# Patient Record
Sex: FEMALE | Race: WHITE | HISPANIC OR LATINO | Employment: STUDENT | URBAN - METROPOLITAN AREA
[De-identification: names, ages, dates, MRNs, and addresses within clinical notes are randomized per-mention and may not be internally consistent; named-entity substitution may affect disease eponyms.]

---
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
## 2023-02-20 ENCOUNTER — OFFICE VISIT (OUTPATIENT)
Dept: FAMILY MEDICINE CLINIC | Facility: CLINIC | Age: 18
End: 2023-02-20

## 2023-02-20 VITALS
HEIGHT: 67 IN | WEIGHT: 167 LBS | DIASTOLIC BLOOD PRESSURE: 76 MMHG | HEART RATE: 58 BPM | OXYGEN SATURATION: 100 % | SYSTOLIC BLOOD PRESSURE: 120 MMHG | BODY MASS INDEX: 26.21 KG/M2

## 2023-02-20 DIAGNOSIS — R73.03 PREDIABETES: ICD-10-CM

## 2023-02-20 DIAGNOSIS — J45.20 MILD INTERMITTENT ASTHMA WITHOUT COMPLICATION: ICD-10-CM

## 2023-02-20 DIAGNOSIS — F90.2 ATTENTION DEFICIT HYPERACTIVITY DISORDER (ADHD), COMBINED TYPE: ICD-10-CM

## 2023-02-20 DIAGNOSIS — J45.20 MILD INTERMITTENT ASTHMATIC BRONCHITIS WITHOUT COMPLICATION: Primary | ICD-10-CM

## 2023-02-20 PROBLEM — J45.909 ASTHMATIC BRONCHITIS WITHOUT COMPLICATION: Status: ACTIVE | Noted: 2023-02-20

## 2023-02-20 RX ORDER — DEXTROMETHORPHAN HYDROBROMIDE AND PROMETHAZINE HYDROCHLORIDE 15; 6.25 MG/5ML; MG/5ML
5 SOLUTION ORAL 4 TIMES DAILY PRN
Qty: 118 ML | Refills: 0 | Status: SHIPPED | OUTPATIENT
Start: 2023-02-20

## 2023-02-20 RX ORDER — AZITHROMYCIN 250 MG/1
TABLET, FILM COATED ORAL
Qty: 6 TABLET | Refills: 0 | Status: SHIPPED | OUTPATIENT
Start: 2023-02-20 | End: 2023-02-25

## 2023-02-20 RX ORDER — DEXTROAMPHETAMINE SACCHARATE, AMPHETAMINE ASPARTATE, DEXTROAMPHETAMINE SULFATE AND AMPHETAMINE SULFATE 1.25; 1.25; 1.25; 1.25 MG/1; MG/1; MG/1; MG/1
1 TABLET ORAL 2 TIMES DAILY
Qty: 60 TABLET | Refills: 0 | Status: SHIPPED | OUTPATIENT
Start: 2023-02-20

## 2023-02-20 NOTE — PROGRESS NOTES
Office Visit Note  23     Connie Boxer 21 y o  female MRN: [id number]  : 2005    Assessment:     1  Mild intermittent asthmatic bronchitis without complication  Assessment & Plan:  Patient with cough congestion symptoms bringing up sometimes yellowish phlegm has been sick couple of weeks ago but still has a cough history of asthma had to use inhaler couple of times but she did not hear any wheezing  Lungs sound clear at this time will prescribe Z-Gee and Phenergan DM       2  Attention deficit hyperactivity disorder (ADHD), combined type  Assessment & Plan:  Patient with attention deficit disorder studying in [place]  She has been on Adderall 5 mg twice a day  We will continue the medication again recommend follow-up with a psychiatrist also she has been trying to get 1 in [place]  3  Prediabetes  Assessment & Plan:  Family history of diabetes last hemoglobin A1c 5 9 we will get a repeat 1 in follow-up      4  Mild intermittent asthma without complication  Assessment & Plan:  Patient with recent asthmatic bronchitis we will continue the inhaler on a as needed basis        BMI Counseling: Body mass index is 26 16 kg/m²  The BMI is above normal  Nutrition recommendations include decreasing portion sizes, decreasing fast food intake, consuming healthier snacks, moderation in carbohydrate intake and reducing intake of cholesterol  Exercise recommendations include moderate physical activity 150 minutes/week  No pharmacotherapy was ordered  Rationale for BMI follow-up plan is due to patient being overweight or obese  Discussion Summary and Plan: Today's care plan and medications were reviewed with patient in detail and all their questions answered to their satisfaction  Chief Complaint   Patient presents with   • Follow-up      Subjective:  Patient is coming here for evaluation regarding symptoms of cough congestion    Patient's roommates were sick couple of weeks ago at the [facility] and subsequently she also developed some cough congestion symptoms sometime bringing up yellowish phlegm now  She had to use the inhaler couple of times  She did not have wheezing  No fever  He had mild sore throat but nothing significant  Medications reviewed  The following portions of the patient's history were reviewed and updated as appropriate: allergies, current medications, past family history, past medical history, past social history, past surgical history and problem list     Review of Systems   Constitutional: Negative for chills and fever  HENT: Negative for ear pain and sore throat  Eyes: Negative for pain and visual disturbance  Respiratory: Positive for cough  Negative for shortness of breath  Cardiovascular: Negative for chest pain and palpitations  Gastrointestinal: Negative for abdominal pain and vomiting  Genitourinary: Negative for dysuria and hematuria  Musculoskeletal: Negative for arthralgias and back pain  Skin: Negative for color change and rash  Neurological: Negative for seizures and syncope  All other systems reviewed and are negative  Historical Information   Patient Active Problem List   Diagnosis   • Attention deficit hyperactivity disorder (ADHD), combined type   • Mild intermittent asthma without complication   • Prediabetes   • Asthmatic bronchitis without complication     Past Medical History:   Diagnosis Date   • Asthma    • Iron deficiency anemia secondary to inadequate dietary iron intake      History reviewed  No pertinent surgical history    Social History     Substance and Sexual Activity   Alcohol Use Yes    Comment: special occasions     Social History     Substance and Sexual Activity   Drug Use Never     Social History     Tobacco Use   Smoking Status Never   Smokeless Tobacco Never     Family History   Problem Relation Age of Onset   • Diabetes Mother    • Hyperlipidemia Mother    • Breast cancer Maternal Aunt    • Breast cancer Maternal Aunt      Health Maintenance Due   Topic   • Hepatitis C Screening    • Pneumococcal Vaccine: Pediatrics (0 to 5 Years) and At-Risk Patients (6 to 59 Years) (1 - PCV)   • DTaP,Tdap,and Td Vaccines (1 - Tdap)   • HPV Vaccine (1 - 2-dose series)   • Depression Screening    • HIV Screening    • BMI: Followup Plan    • COVID-19 Vaccine (3 - Booster for [employer] series)   • Influenza Vaccine (1)      Meds/Allergies       Current Outpatient Medications:   •  albuterol (PROVENTIL HFA,VENTOLIN HFA) 90 mcg/act inhaler, Inhale 2 puffs every 6 (six) hours as needed for wheezing, Disp: , Rfl:   •  amphetamine-dextroamphetamine (ADDERALL) 5 MG tablet, Take 1 tablet (5 mg total) by mouth 2 (two) times a day Max Daily Amount: 10 mg, Disp: 60 tablet, Rfl: 0  •  etonogestrel-ethinyl estradiol (EluRyng) 0 12-0 015 MG/24HR vaginal ring, Insert 1 each into the vagina every 28 days Insert vaginally and leave in place for 3 consecutive weeks, then remove for 1 week , Disp: 3 each, Rfl: 3      Objective:    Vitals:   /76 (BP Location: Right arm, Patient Position: Sitting, Cuff Size: Standard)   Pulse 58   Ht 5' 7" (1 702 m)   Wt 75 8 kg (167 lb)   SpO2 100%   BMI 26 16 kg/m²   Body mass index is 26 16 kg/m²  Vitals:    02/20/23 1150   Weight: 75 8 kg (167 lb)       Physical Exam  Vitals and nursing note reviewed  Constitutional:       Appearance: Normal appearance  HENT:      Mouth/Throat:      Comments: Mild congestion in the throat  Cardiovascular:      Rate and Rhythm: Normal rate and regular rhythm  Heart sounds: Normal heart sounds  Pulmonary:      Effort: Pulmonary effort is normal       Breath sounds: Normal breath sounds  Musculoskeletal:      Cervical back: Normal range of motion and neck supple  Right lower leg: No edema  Left lower leg: No edema  Neurological:      Mental Status: She is alert and oriented to person, place, and time           Lab Review   No visits with results within 2 Month(s) from this visit  Latest known visit with results is:   Annual Exam on 08/23/2022   Component Date Value Ref Range Status   • N gonorrhoeae, DNA Probe 08/23/2022 Negative  Negative Final   • Chlamydia trachomatis, DNA Probe 08/23/2022 Negative  Negative Final         Mendoza Osborne MD        "This note has been constructed using a voice recognition system  Therefore there may be syntax, spelling, and/or grammatical errors   Please call if you have any questions  "

## 2023-02-20 NOTE — ASSESSMENT & PLAN NOTE
Patient with attention deficit disorder studying in [place]  She has been on Adderall 5 mg twice a day  We will continue the medication again recommend follow-up with a psychiatrist also she has been trying to get 1 in [place]

## 2023-02-20 NOTE — ASSESSMENT & PLAN NOTE
Patient with cough congestion symptoms bringing up sometimes yellowish phlegm has been sick couple of weeks ago but still has a cough history of asthma had to use inhaler couple of times but she did not hear any wheezing    Lungs sound clear at this time will prescribe Z-Gee and Phenergan DM

## 2023-03-14 LAB
ALBUMIN SERPL-MCNC: 4.6 G/DL (ref 3.9–5)
ALBUMIN/GLOB SERPL: 2.4 {RATIO} (ref 1.2–2.2)
ALP SERPL-CCNC: 70 IU/L (ref 42–106)
ALT SERPL-CCNC: 11 IU/L (ref 0–32)
AST SERPL-CCNC: 14 IU/L (ref 0–40)
BILIRUB SERPL-MCNC: 0.6 MG/DL (ref 0–1.2)
BUN SERPL-MCNC: 13 MG/DL (ref 6–20)
BUN/CREAT SERPL: 19 (ref 9–23)
CALCIUM SERPL-MCNC: 9 MG/DL (ref 8.7–10.2)
CHLORIDE SERPL-SCNC: 103 MMOL/L (ref 96–106)
CO2 SERPL-SCNC: 21 MMOL/L (ref 20–29)
CREAT SERPL-MCNC: 0.7 MG/DL (ref 0.57–1)
EGFR: 128 ML/MIN/1.73
GLOBULIN SER-MCNC: 1.9 G/DL (ref 1.5–4.5)
GLUCOSE SERPL-MCNC: 97 MG/DL (ref 70–99)
HBA1C MFR BLD: 5.7 % (ref 4.8–5.6)
POTASSIUM SERPL-SCNC: 4.6 MMOL/L (ref 3.5–5.2)
PROT SERPL-MCNC: 6.5 G/DL (ref 6–8.5)
SODIUM SERPL-SCNC: 140 MMOL/L (ref 134–144)

## 2023-03-17 ENCOUNTER — OFFICE VISIT (OUTPATIENT)
Dept: FAMILY MEDICINE CLINIC | Facility: CLINIC | Age: 18
End: 2023-03-17

## 2023-03-17 VITALS
SYSTOLIC BLOOD PRESSURE: 120 MMHG | OXYGEN SATURATION: 99 % | HEART RATE: 82 BPM | BODY MASS INDEX: 26.93 KG/M2 | HEIGHT: 67 IN | WEIGHT: 171.6 LBS | DIASTOLIC BLOOD PRESSURE: 76 MMHG

## 2023-03-17 DIAGNOSIS — J45.20 MILD INTERMITTENT ASTHMA WITHOUT COMPLICATION: ICD-10-CM

## 2023-03-17 DIAGNOSIS — F90.2 ATTENTION DEFICIT HYPERACTIVITY DISORDER (ADHD), COMBINED TYPE: Primary | ICD-10-CM

## 2023-03-17 DIAGNOSIS — R73.03 PREDIABETES: ICD-10-CM

## 2023-03-17 PROBLEM — J45.909 ASTHMATIC BRONCHITIS WITHOUT COMPLICATION: Status: RESOLVED | Noted: 2023-02-20 | Resolved: 2023-03-17

## 2023-03-17 RX ORDER — DEXTROAMPHETAMINE SACCHARATE, AMPHETAMINE ASPARTATE, DEXTROAMPHETAMINE SULFATE AND AMPHETAMINE SULFATE 1.25; 1.25; 1.25; 1.25 MG/1; MG/1; MG/1; MG/1
1 TABLET ORAL 2 TIMES DAILY
Qty: 60 TABLET | Refills: 0 | Status: SHIPPED | OUTPATIENT
Start: 2023-03-17

## 2023-03-17 NOTE — ASSESSMENT & PLAN NOTE
Patient with attention deficit disorder currently taking Adderall 5 mg twice a day appears to be helping we will continue with the same    She is going to be followed up in [place] where she is studying by the psychiatrist over there regarding the same

## 2023-03-17 NOTE — ASSESSMENT & PLAN NOTE
Hemoglobin A1c came down to 5 7 she is going to follow strict diet with regards to carbohydrates and sugar we will be repeating the lab in 4 months from now including A1c

## 2023-03-17 NOTE — PROGRESS NOTES
Office Visit Note  23     See Leary 21 y o  female MRN: [id number]  : 2005    Assessment:     1  Attention deficit hyperactivity disorder (ADHD), combined type  Assessment & Plan:  Patient with attention deficit disorder currently taking Adderall 5 mg twice a day appears to be helping we will continue with the same  She is going to be followed up in [place] where she is studying by the psychiatrist over there regarding the same      2  Mild intermittent asthma without complication  Assessment & Plan:  Asthmatic bronchitis has resolved but patient on and off occasionally uses Proventil inhaler we will continue to do so  Orders:  -     amphetamine-dextroamphetamine (ADDERALL) 5 MG tablet; Take 1 tablet (5 mg total) by mouth 2 (two) times a day Max Daily Amount: 10 mg    3  Prediabetes  Assessment & Plan:  Hemoglobin A1c came down to 5 7 she is going to follow strict diet with regards to carbohydrates and sugar we will be repeating the lab in 4 months from now including A1c  Orders:  -     Comprehensive metabolic panel; Future  -     Hemoglobin A1C; Future; Expected date: 2023        Depression Screening and Follow-up Plan: Patient was screened for depression during today's encounter  They screened negative with a PHQ-2 score of 0  Discussion Summary and Plan: Today's care plan and medications were reviewed with patient in detail and all their questions answered to their satisfaction  Chief Complaint   Patient presents with   • Follow-up      Subjective:  Patient has come in for a follow-up evaluation with a history of attention deficit disorder currently taking Adderall 5 mg twice a day  Patient also had asthmatic bronchitis last month which got resolved with medications  She occasionally still uses the inhaler  Labs reviewed hemoglobin A1c came down from 5 9-5 7 family history of diabetes    She has cut back on sugary substances which appears to be helping at this point       The following portions of the patient's history were reviewed and updated as appropriate: allergies, current medications, past family history, past medical history, past social history, past surgical history and problem list     Review of Systems   Constitutional: Negative for chills and fever  HENT: Negative for ear pain and sore throat  Eyes: Negative for pain and visual disturbance  Respiratory: Negative for cough and shortness of breath  Cardiovascular: Negative for chest pain and palpitations  Gastrointestinal: Negative for abdominal pain and vomiting  Genitourinary: Negative for dysuria and hematuria  Musculoskeletal: Negative for arthralgias and back pain  Skin: Negative for color change and rash  Neurological: Negative for seizures and syncope  All other systems reviewed and are negative  Historical Information   Patient Active Problem List   Diagnosis   • Attention deficit hyperactivity disorder (ADHD), combined type   • Mild intermittent asthma without complication   • Prediabetes     Past Medical History:   Diagnosis Date   • Asthma    • Iron deficiency anemia secondary to inadequate dietary iron intake      History reviewed  No pertinent surgical history    Social History     Substance and Sexual Activity   Alcohol Use Yes    Comment: special occasions     Social History     Substance and Sexual Activity   Drug Use Never     Social History     Tobacco Use   Smoking Status Never   • Passive exposure: Never   Smokeless Tobacco Never     Family History   Problem Relation Age of Onset   • Diabetes Mother    • Hyperlipidemia Mother    • Breast cancer Maternal Aunt    • Breast cancer Maternal Aunt      Health Maintenance Due   Topic   • Hepatitis C Screening    • Pneumococcal Vaccine: Pediatrics (0 to 5 Years) and At-Risk Patients (6 to 59 Years) (1 - PCV)   • DTaP,Tdap,and Td Vaccines (1 - Tdap)   • HPV Vaccine (1 - 2-dose series)   • HIV Screening    • COVID-19 Vaccine (3 - Booster for [employer] series)   • Influenza Vaccine (1)      Meds/Allergies       Current Outpatient Medications:   •  albuterol (PROVENTIL HFA,VENTOLIN HFA) 90 mcg/act inhaler, Inhale 2 puffs every 6 (six) hours as needed for wheezing, Disp: , Rfl:   •  amphetamine-dextroamphetamine (ADDERALL) 5 MG tablet, Take 1 tablet (5 mg total) by mouth 2 (two) times a day Max Daily Amount: 10 mg, Disp: 60 tablet, Rfl: 0  •  etonogestrel-ethinyl estradiol (EluRyng) 0 12-0 015 MG/24HR vaginal ring, Insert 1 each into the vagina every 28 days Insert vaginally and leave in place for 3 consecutive weeks, then remove for 1 week , Disp: 3 each, Rfl: 3      Objective:    Vitals:   /76 (BP Location: Right arm, Patient Position: Sitting, Cuff Size: Standard)   Pulse 82   Ht 5' 7" (1 702 m)   Wt 77 8 kg (171 lb 9 6 oz)   SpO2 99%   BMI 26 88 kg/m²   Body mass index is 26 88 kg/m²  Vitals:    03/17/23 1053   Weight: 77 8 kg (171 lb 9 6 oz)       Physical Exam  Vitals and nursing note reviewed  Constitutional:       Appearance: Normal appearance  Cardiovascular:      Rate and Rhythm: Normal rate and regular rhythm  Heart sounds: Normal heart sounds  Pulmonary:      Effort: Pulmonary effort is normal       Breath sounds: Normal breath sounds  Musculoskeletal:      Cervical back: Normal range of motion and neck supple  Right lower leg: No edema  Left lower leg: No edema  Neurological:      Mental Status: She is alert           Lab Review   Orders Only on 03/13/2023   Component Date Value Ref Range Status   • Glucose, Random 03/13/2023 97  70 - 99 mg/dL Final   • BUN 03/13/2023 13  6 - 20 mg/dL Final   • Creatinine 03/13/2023 0 70  0 57 - 1 00 mg/dL Final   • eGFR 03/13/2023 128  >59 mL/min/1 73 Final   • SL AMB BUN/CREATININE RATIO 03/13/2023 19  9 - 23 Final   • Sodium 03/13/2023 140  134 - 144 mmol/L Final   • Potassium 03/13/2023 4 6  3 5 - 5 2 mmol/L Final   • Chloride 03/13/2023 103  96 - 106 mmol/L Final   • CO2 03/13/2023 21  20 - 29 mmol/L Final   • CALCIUM 03/13/2023 9 0  8 7 - 10 2 mg/dL Final   • Protein, Total 03/13/2023 6 5  6 0 - 8 5 g/dL Final   • Albumin 03/13/2023 4 6  3 9 - 5 0 g/dL Final   • Globulin, Total 03/13/2023 1 9  1 5 - 4 5 g/dL Final   • Albumin/Globulin Ratio 03/13/2023 2 4 (H)  1 2 - 2 2 Final   • TOTAL BILIRUBIN 03/13/2023 0 6  0 0 - 1 2 mg/dL Final   • Alk Phos Isoenzymes 03/13/2023 70  42 - 106 IU/L Final   • AST 03/13/2023 14  0 - 40 IU/L Final   • ALT 03/13/2023 11  0 - 32 IU/L Final   • Hemoglobin A1C 03/13/2023 5 7 (H)  4 8 - 5 6 % Final    Comment:          Prediabetes: 5 7 - 6 4           Diabetes: >6 4           Glycemic control for adults with diabetes: <7 0           Mendoza Osborne MD        "This note has been constructed using a voice recognition system  Therefore there may be syntax, spelling, and/or grammatical errors   Please call if you have any questions  "

## 2023-03-17 NOTE — ASSESSMENT & PLAN NOTE
Asthmatic bronchitis has resolved but patient on and off occasionally uses Proventil inhaler we will continue to do so

## 2023-05-22 ENCOUNTER — OFFICE VISIT (OUTPATIENT)
Dept: FAMILY MEDICINE CLINIC | Facility: CLINIC | Age: 18
End: 2023-05-22

## 2023-05-22 VITALS
BODY MASS INDEX: 25.39 KG/M2 | OXYGEN SATURATION: 99 % | HEART RATE: 105 BPM | WEIGHT: 161.8 LBS | HEIGHT: 67 IN | SYSTOLIC BLOOD PRESSURE: 110 MMHG | DIASTOLIC BLOOD PRESSURE: 70 MMHG

## 2023-05-22 DIAGNOSIS — F90.2 ATTENTION DEFICIT HYPERACTIVITY DISORDER (ADHD), COMBINED TYPE: Primary | ICD-10-CM

## 2023-05-22 DIAGNOSIS — J45.20 MILD INTERMITTENT ASTHMA WITHOUT COMPLICATION: ICD-10-CM

## 2023-05-22 DIAGNOSIS — R73.03 PREDIABETES: ICD-10-CM

## 2023-05-22 RX ORDER — DEXTROAMPHETAMINE SACCHARATE, AMPHETAMINE ASPARTATE, DEXTROAMPHETAMINE SULFATE AND AMPHETAMINE SULFATE 1.25; 1.25; 1.25; 1.25 MG/1; MG/1; MG/1; MG/1
1 TABLET ORAL 2 TIMES DAILY
Qty: 60 TABLET | Refills: 0 | Status: SHIPPED | OUTPATIENT
Start: 2023-05-22

## 2023-05-22 NOTE — ASSESSMENT & PLAN NOTE
Patient with a history of asthmatic bronchitis in the past currently on albuterol inhaler on a as needed basis we will continue

## 2023-05-22 NOTE — PROGRESS NOTES
Office Visit Note  23     Briana Bose 19 y o  female MRN: [id number]  : 2005    Assessment:     1  Attention deficit hyperactivity disorder (ADHD), combined type  Assessment & Plan:  Continue with Adderall 15 mg twice a day follow-up with a psychiatrist in [place]  2  Mild intermittent asthma without complication  Assessment & Plan:  Patient with a history of asthmatic bronchitis in the past currently on albuterol inhaler on a as needed basis we will continue    Orders:  -     amphetamine-dextroamphetamine (ADDERALL) 5 MG tablet; Take 1 tablet (5 mg total) by mouth 2 (two) times a day Max Daily Amount: 10 mg    3  Prediabetes  Assessment & Plan:  Last A1c 5 7 follow-up with repeat 22 later date               Discussion Summary and Plan: Today's care plan and medications were reviewed with patient in detail and all their questions answered to their satisfaction  Chief Complaint   Patient presents with   • Follow-up     F/U and RX Refill  Canelo Ramirez        Subjective:  Patient is coming here for a follow-up evaluation with regards to symptoms of attention deficit disorder currently taking Adderall for the same  She is going to be followed by a psychologist through the [facility] in [place]  Labs done in the past had shown her hemoglobin A1c at 5 7 in both parents being diabetic  He is trying to follow strict diet we will follow-up with repeat A1c later  Meanwhile we will renew her medication for the Adderall  The following portions of the patient's history were reviewed and updated as appropriate: allergies, current medications, past family history, past medical history, past social history, past surgical history and problem list     Review of Systems   Constitutional: Negative for chills and fever  HENT: Negative for ear pain and sore throat  Eyes: Negative for pain and visual disturbance  Respiratory: Negative for cough and shortness of breath      Cardiovascular: Negative for chest pain and palpitations  Gastrointestinal: Negative for abdominal pain and vomiting  Genitourinary: Negative for dysuria and hematuria  Musculoskeletal: Negative for arthralgias and back pain  Skin: Negative for color change and rash  Neurological: Negative for seizures and syncope  All other systems reviewed and are negative  Historical Information   Patient Active Problem List   Diagnosis   • Attention deficit hyperactivity disorder (ADHD), combined type   • Mild intermittent asthma without complication   • Prediabetes     Past Medical History:   Diagnosis Date   • Asthma    • Iron deficiency anemia secondary to inadequate dietary iron intake      No past surgical history on file    Social History     Substance and Sexual Activity   Alcohol Use Not Currently    Comment: special occasions     Social History     Substance and Sexual Activity   Drug Use Never     Social History     Tobacco Use   Smoking Status Never   • Passive exposure: Never   Smokeless Tobacco Never     Family History   Problem Relation Age of Onset   • Diabetes Mother    • Hyperlipidemia Mother    • Breast cancer Maternal Aunt    • Breast cancer Maternal Aunt      Health Maintenance Due   Topic   • Hepatitis C Screening    • Pneumococcal Vaccine: Pediatrics (0 to 5 Years) and At-Risk Patients (6 to 59 Years) (1 - PCV)   • DTaP,Tdap,and Td Vaccines (1 - Tdap)   • HPV Vaccine (1 - 2-dose series)   • HIV Screening    • COVID-19 Vaccine (3 - Booster for [employer] series)      Meds/Allergies       Current Outpatient Medications:   •  albuterol (PROVENTIL HFA,VENTOLIN HFA) 90 mcg/act inhaler, Inhale 2 puffs every 6 (six) hours as needed for wheezing, Disp: , Rfl:   •  amphetamine-dextroamphetamine (ADDERALL) 5 MG tablet, Take 1 tablet (5 mg total) by mouth 2 (two) times a day Max Daily Amount: 10 mg, Disp: 60 tablet, Rfl: 0  •  etonogestrel-ethinyl estradiol (EluRyng) 0 12-0 015 MG/24HR vaginal ring, Insert 1 each into the vagina "every 28 days Insert vaginally and leave in place for 3 consecutive weeks, then remove for 1 week , Disp: 3 each, Rfl: 3      Objective:    Vitals:   /70 (BP Location: Right arm, Patient Position: Sitting, Cuff Size: Standard)   Pulse 105   Ht 5' 7\" (1 702 m)   Wt 73 4 kg (161 lb 12 8 oz)   SpO2 99%   BMI 25 34 kg/m²   Body mass index is 25 34 kg/m²  Vitals:    05/22/23 1428   Weight: 73 4 kg (161 lb 12 8 oz)       Physical Exam  Vitals and nursing note reviewed  Constitutional:       Appearance: Normal appearance  Cardiovascular:      Rate and Rhythm: Normal rate and regular rhythm  Heart sounds: Normal heart sounds  Pulmonary:      Effort: Pulmonary effort is normal       Breath sounds: Normal breath sounds  Musculoskeletal:      Cervical back: Normal range of motion and neck supple  Right lower leg: No edema  Left lower leg: No edema  Neurological:      Mental Status: She is alert and oriented to person, place, and time  Lab Review   No visits with results within 2 Month(s) from this visit     Latest known visit with results is:   Orders Only on 03/13/2023   Component Date Value Ref Range Status   • Glucose, Random 03/13/2023 97  70 - 99 mg/dL Final   • BUN 03/13/2023 13  6 - 20 mg/dL Final   • Creatinine 03/13/2023 0 70  0 57 - 1 00 mg/dL Final   • eGFR 03/13/2023 128  >59 mL/min/1 73 Final   • SL AMB BUN/CREATININE RATIO 03/13/2023 19  9 - 23 Final   • Sodium 03/13/2023 140  134 - 144 mmol/L Final   • Potassium 03/13/2023 4 6  3 5 - 5 2 mmol/L Final   • Chloride 03/13/2023 103  96 - 106 mmol/L Final   • CO2 03/13/2023 21  20 - 29 mmol/L Final   • CALCIUM 03/13/2023 9 0  8 7 - 10 2 mg/dL Final   • Protein, Total 03/13/2023 6 5  6 0 - 8 5 g/dL Final   • Albumin 03/13/2023 4 6  3 9 - 5 0 g/dL Final   • Globulin, Total 03/13/2023 1 9  1 5 - 4 5 g/dL Final   • Albumin/Globulin Ratio 03/13/2023 2 4 (H)  1 2 - 2 2 Final   • TOTAL BILIRUBIN 03/13/2023 0 6  0 0 - 1 2 mg/dL " "Final   • Alk Phos Isoenzymes 03/13/2023 70  42 - 106 IU/L Final   • AST 03/13/2023 14  0 - 40 IU/L Final   • ALT 03/13/2023 11  0 - 32 IU/L Final   • Hemoglobin A1C 03/13/2023 5 7 (H)  4 8 - 5 6 % Final    Comment:          Prediabetes: 5 7 - 6 4           Diabetes: >6 4           Glycemic control for adults with diabetes: <7 0           Joan Aguilar MD        \"This note has been constructed using a voice recognition system  Therefore there may be syntax, spelling, and/or grammatical errors  Please call if you have any questions   \"  "

## 2023-06-22 ENCOUNTER — OFFICE VISIT (OUTPATIENT)
Dept: FAMILY MEDICINE CLINIC | Facility: CLINIC | Age: 18
End: 2023-06-22
Payer: COMMERCIAL

## 2023-06-22 VITALS
OXYGEN SATURATION: 99 % | HEART RATE: 78 BPM | DIASTOLIC BLOOD PRESSURE: 70 MMHG | SYSTOLIC BLOOD PRESSURE: 110 MMHG | BODY MASS INDEX: 24.33 KG/M2 | HEIGHT: 67 IN | WEIGHT: 155 LBS

## 2023-06-22 DIAGNOSIS — R73.03 PREDIABETES: ICD-10-CM

## 2023-06-22 DIAGNOSIS — F90.2 ATTENTION DEFICIT HYPERACTIVITY DISORDER (ADHD), COMBINED TYPE: Primary | ICD-10-CM

## 2023-06-22 DIAGNOSIS — J45.20 MILD INTERMITTENT ASTHMA WITHOUT COMPLICATION: ICD-10-CM

## 2023-06-22 PROCEDURE — 99213 OFFICE O/P EST LOW 20 MIN: CPT | Performed by: INTERNAL MEDICINE

## 2023-06-22 RX ORDER — DEXTROAMPHETAMINE SACCHARATE, AMPHETAMINE ASPARTATE, DEXTROAMPHETAMINE SULFATE AND AMPHETAMINE SULFATE 1.25; 1.25; 1.25; 1.25 MG/1; MG/1; MG/1; MG/1
1 TABLET ORAL 2 TIMES DAILY
Qty: 60 TABLET | Refills: 0 | Status: SHIPPED | OUTPATIENT
Start: 2023-06-22

## 2023-06-22 RX ORDER — ALBUTEROL SULFATE 90 UG/1
2 AEROSOL, METERED RESPIRATORY (INHALATION) EVERY 6 HOURS PRN
Qty: 8 G | Refills: 1 | Status: SHIPPED | OUTPATIENT
Start: 2023-06-22

## 2023-06-22 NOTE — ASSESSMENT & PLAN NOTE
Patient with a history of asthma her current inhaler has  we will renew the same    She did not have to use the inhalers in the recent past

## 2023-06-22 NOTE — PROGRESS NOTES
Office Visit Note  23     Chhaya Goddard 19 y o  female MRN: [id number]  : 2005    Assessment:     1  Attention deficit hyperactivity disorder (ADHD), combined type  Assessment & Plan: We will continue the medication Adderall she is going to have a follow-up appointment with the psychiatrist in [place] also  2  Prediabetes  Assessment & Plan:  Last A1c 5 7 patient is going to have repeat 1 done in July      3  Mild intermittent asthma without complication  Assessment & Plan:  Patient with a history of asthma her current inhaler has  we will renew the same  She did not have to use the inhalers in the recent past               Discussion Summary and Plan: Today's care plan and medications were reviewed with patient in detail and all their questions answered to their satisfaction  Chief Complaint   Patient presents with   • Follow-up      Subjective:  Patient is coming in for a follow-up evaluation with a history of attention deficit disorder currently taking Adderall  Patient also has history of asthma but did not have to use any inhalers in the recent past   Last lab had shown a prediabetic range of hemoglobin A1c at 5 7 it was 5 9 before it is coming down  The following portions of the patient's history were reviewed and updated as appropriate: allergies, current medications, past family history, past medical history, past social history, past surgical history and problem list     Review of Systems   Constitutional: Negative for chills and fever  HENT: Negative for ear pain and sore throat  Eyes: Negative for pain and visual disturbance  Respiratory: Negative for cough and shortness of breath  Cardiovascular: Negative for chest pain and palpitations  Gastrointestinal: Negative for abdominal pain and vomiting  Genitourinary: Negative for dysuria and hematuria  Musculoskeletal: Negative for arthralgias and back pain  Skin: Negative for color change and rash  Neurological: Negative for seizures and syncope  All other systems reviewed and are negative  Historical Information   Patient Active Problem List   Diagnosis   • Attention deficit hyperactivity disorder (ADHD), combined type   • Mild intermittent asthma without complication   • Prediabetes     Past Medical History:   Diagnosis Date   • Asthma    • Iron deficiency anemia secondary to inadequate dietary iron intake      History reviewed  No pertinent surgical history    Social History     Substance and Sexual Activity   Alcohol Use Not Currently    Comment: special occasions     Social History     Substance and Sexual Activity   Drug Use Never     Social History     Tobacco Use   Smoking Status Never   • Passive exposure: Never   Smokeless Tobacco Never     Family History   Problem Relation Age of Onset   • Diabetes Mother    • Hyperlipidemia Mother    • Breast cancer Maternal Aunt    • Breast cancer Maternal Aunt      Health Maintenance Due   Topic   • Hepatitis C Screening    • Pneumococcal Vaccine: Pediatrics (0 to 5 Years) and At-Risk Patients (6 to 59 Years) (1 - PCV)   • DTaP,Tdap,and Td Vaccines (1 - Tdap)   • HPV Vaccine (1 - 2-dose series)   • HIV Screening    • COVID-19 Vaccine (3 - Pfizer series)   • Annual Physical    • Influenza Vaccine (Season Ended)      Meds/Allergies       Current Outpatient Medications:   •  albuterol (PROVENTIL HFA,VENTOLIN HFA) 90 mcg/act inhaler, Inhale 2 puffs every 6 (six) hours as needed for wheezing, Disp: , Rfl:   •  amphetamine-dextroamphetamine (ADDERALL) 5 MG tablet, Take 1 tablet (5 mg total) by mouth 2 (two) times a day Max Daily Amount: 10 mg, Disp: 60 tablet, Rfl: 0  •  etonogestrel-ethinyl estradiol (EluRyng) 0 12-0 015 MG/24HR vaginal ring, Insert 1 each into the vagina every 28 days Insert vaginally and leave in place for 3 consecutive weeks, then remove for 1 week , Disp: 3 each, Rfl: 3      Objective:    Vitals:   /70 (BP Location: Right arm, "Patient Position: Sitting, Cuff Size: Standard)   Pulse 78   Ht 5' 7\" (1 702 m)   Wt 70 3 kg (155 lb)   SpO2 99%   BMI 24 28 kg/m²   Body mass index is 24 28 kg/m²  Vitals:    06/22/23 0907   Weight: 70 3 kg (155 lb)       Physical Exam  Vitals and nursing note reviewed  Constitutional:       Appearance: Normal appearance  Cardiovascular:      Rate and Rhythm: Normal rate and regular rhythm  Heart sounds: Normal heart sounds  Pulmonary:      Effort: Pulmonary effort is normal       Breath sounds: Normal breath sounds  Musculoskeletal:      Cervical back: Normal range of motion and neck supple  Right lower leg: No edema  Left lower leg: No edema  Neurological:      Mental Status: She is alert  Lab Review   No visits with results within 2 Month(s) from this visit     Latest known visit with results is:   Orders Only on 03/13/2023   Component Date Value Ref Range Status   • Glucose, Random 03/13/2023 97  70 - 99 mg/dL Final   • BUN 03/13/2023 13  6 - 20 mg/dL Final   • Creatinine 03/13/2023 0 70  0 57 - 1 00 mg/dL Final   • eGFR 03/13/2023 128  >59 mL/min/1 73 Final   • SL AMB BUN/CREATININE RATIO 03/13/2023 19  9 - 23 Final   • Sodium 03/13/2023 140  134 - 144 mmol/L Final   • Potassium 03/13/2023 4 6  3 5 - 5 2 mmol/L Final   • Chloride 03/13/2023 103  96 - 106 mmol/L Final   • CO2 03/13/2023 21  20 - 29 mmol/L Final   • CALCIUM 03/13/2023 9 0  8 7 - 10 2 mg/dL Final   • Protein, Total 03/13/2023 6 5  6 0 - 8 5 g/dL Final   • Albumin 03/13/2023 4 6  3 9 - 5 0 g/dL Final   • Globulin, Total 03/13/2023 1 9  1 5 - 4 5 g/dL Final   • Albumin/Globulin Ratio 03/13/2023 2 4 (H)  1 2 - 2 2 Final   • TOTAL BILIRUBIN 03/13/2023 0 6  0 0 - 1 2 mg/dL Final   • Alk Phos Isoenzymes 03/13/2023 70  42 - 106 IU/L Final   • AST 03/13/2023 14  0 - 40 IU/L Final   • ALT 03/13/2023 11  0 - 32 IU/L Final   • Hemoglobin A1C 03/13/2023 5 7 (H)  4 8 - 5 6 % Final    Comment:          Prediabetes: 5 7 - " "6 4           Diabetes: >6 4           Glycemic control for adults with diabetes: <7 0           Esdras Rodriguez MD        \"This note has been constructed using a voice recognition system  Therefore there may be syntax, spelling, and/or grammatical errors  Please call if you have any questions   \"  "

## 2023-06-22 NOTE — ASSESSMENT & PLAN NOTE
We will continue the medication Adderall she is going to have a follow-up appointment with the psychiatrist in [place] also

## 2023-07-21 ENCOUNTER — TELEMEDICINE (OUTPATIENT)
Dept: FAMILY MEDICINE CLINIC | Facility: CLINIC | Age: 18
End: 2023-07-21
Payer: COMMERCIAL

## 2023-07-21 VITALS — WEIGHT: 155 LBS | BODY MASS INDEX: 24.28 KG/M2

## 2023-07-21 DIAGNOSIS — J45.20 MILD INTERMITTENT ASTHMA WITHOUT COMPLICATION: Primary | ICD-10-CM

## 2023-07-21 PROCEDURE — 99441 PR PHYS/QHP TELEPHONE EVALUATION 5-10 MIN: CPT | Performed by: INTERNAL MEDICINE

## 2023-07-21 RX ORDER — BUDESONIDE 180 UG/1
1 AEROSOL, POWDER RESPIRATORY (INHALATION) 2 TIMES DAILY
Qty: 1 EACH | Refills: 1 | Status: SHIPPED | OUTPATIENT
Start: 2023-07-21

## 2023-07-26 NOTE — PROGRESS NOTES
Virtual Brief Visit    This Visit is being completed by telephone. The patient was identified by name and date of birth. Padmini Strauss was informed that this is a telemedicine visit and that the visit is being conducted through Telephone. My office door was closed. No one else was in the room. and The patient was notified the following individuals were present in the room none. She acknowledged consent and understanding of privacy and security of the video platform. The patient has agreed to participate and understands they can discontinue the visit at any time. Patient is aware this is a billable service. Assessment/Plan:    Problem List Items Addressed This Visit        Respiratory    Mild intermittent asthma without complication - Primary     Patient has developed cough congestion symptoms and was seen by the physician in the [facility] symptoms persisting. Patient with mild intermittent asthma on albuterol inhaler as needed. No fever mild tightness feeling. We will add Pulmicort flex inhaler and see if it makes a difference. If necessary oral steroids. Patient does not appear to be in any distress         Relevant Medications    budesonide (Pulmicort Flexhaler) 180 MCG/ACT inhaler       Recent Visits  Date Type Provider Dept   07/21/23 Telemedicine Mendoza Saldivar MD [facility]   Showing recent visits within past 7 days and meeting all other requirements  Future Appointments  No visits were found meeting these conditions.   Showing future appointments within next 150 days and meeting all other requirements         Visit Time  Total Visit Duration: 10 minutes

## 2023-07-26 NOTE — ASSESSMENT & PLAN NOTE
Patient has developed cough congestion symptoms and was seen by the physician in the [facility] symptoms persisting. Patient with mild intermittent asthma on albuterol inhaler as needed. No fever mild tightness feeling. We will add Pulmicort flex inhaler and see if it makes a difference. If necessary oral steroids.   Patient does not appear to be in any distress

## 2023-08-22 NOTE — PROGRESS NOTES
ANNUAL    Name: Jef Ann      : 2005      MRN: [id number]  Encounter Provider: Charito Dior MD  Encounter Date: 2023   Encounter department: [address] [place] [address] Road     1. Viral syndrome  Assessment & Plan:  As mentioned above patient with viral syndrome symptoms getting better but still cough present with yellowish phlegm we will give her prednisone if necessary Zithromax. 2. Attention deficit hyperactivity disorder (ADHD), combined type  Assessment & Plan:  Patient with attention deficit disorder we will increase the dose of the Adderall to 10 mg twice a day and follow-up with a psychiatrist in [place]    Orders:  -     amphetamine-dextroamphetamine (ADDERALL, 10MG,) 10 mg tablet; Take 1 tablet (10 mg total) by mouth 2 (two) times a day Max Daily Amount: 20 mg    3. Mild intermittent asthma without complication  Assessment & Plan:  Patient with history of asthmatic bronchitis recently flulike symptoms was on prednisone and albuterol inhaler symptoms still persisting we will give her another 5 days of prednisone patient may need antibiotic Zithromax also. Orders:  -     predniSONE 20 mg tablet; Take 1 tablet (20 mg total) by mouth daily  -     azithromycin (Zithromax) 250 mg tablet; Take 2 tablets (500 mg total) by mouth daily for 1 day, THEN 1 tablet (250 mg total) daily for 4 days. 4. Prediabetes  Assessment & Plan:  Patient with A1c of 5.7 we will go for repeat blood test however I recommended not to go at this time because of being on prednisone she can give like 4 weeks time after finishing the course to go for the blood test.           Subjective     Patient is coming in for evaluation regarding cough congestion symptoms recently diagnosed with flulike symptoms was seen by a physician at [place] and was prescribed prednisone 20 mg daily for 5 days got better but the symptoms are still persisting. Patient bringing up some yellowish phlegm also. History of asthma. Patient also with a history of attention deficit disorder currently taking 5 mg twice daily of Adderall but it appears to be not helping much. Review of Systems   Constitutional: Negative for chills and fever. HENT: Negative for ear pain and sore throat. Eyes: Negative for pain and visual disturbance. Respiratory: Positive for cough and shortness of breath. Cardiovascular: Negative for chest pain and palpitations. Gastrointestinal: Negative for abdominal pain and vomiting. Genitourinary: Negative for dysuria and hematuria. Musculoskeletal: Negative for arthralgias and back pain. Skin: Negative for color change and rash. Neurological: Negative for seizures and syncope. All other systems reviewed and are negative. Past Medical History:   Diagnosis Date   • Asthma    • Iron deficiency anemia secondary to inadequate dietary iron intake      History reviewed. No pertinent surgical history.   Family History   Problem Relation Age of Onset   • Diabetes Mother    • Hyperlipidemia Mother    • Breast cancer Maternal Aunt    • Breast cancer Maternal Aunt      Social History     Socioeconomic History   • Marital status: Single     Spouse name: None   • Number of children: None   • Years of education: None   • Highest education level: None   Occupational History   • None   Tobacco Use   • Smoking status: Never     Passive exposure: Never   • Smokeless tobacco: Never   Vaping Use   • Vaping Use: Former   • Substances: Nicotine, Flavoring   Substance and Sexual Activity   • Alcohol use: Not Currently     Comment: special occasions   • Drug use: Never   • Sexual activity: Yes     Partners: Male     Birth control/protection: Condom Male, Ring   Other Topics Concern   • None   Social History Narrative   • None     Social Determinants of Health     Financial Resource Strain: Not on file   Food Insecurity: Not on file   Transportation Needs: Not on file   Physical Activity: Not on file Stress: Not on file   Social Connections: Not on file   Intimate Partner Violence: Not on file   Housing Stability: Not on file     Current Outpatient Medications on File Prior to Visit   Medication Sig   • albuterol (PROVENTIL HFA,VENTOLIN HFA) 90 mcg/act inhaler Inhale 2 puffs every 6 (six) hours as needed for wheezing   • budesonide (Pulmicort Flexhaler) 180 MCG/ACT inhaler Inhale 1 puff 2 (two) times a day Rinse mouth after use. • etonogestrel-ethinyl estradiol (EluRyng) 0.12-0.015 MG/24HR vaginal ring Insert 1 each into the vagina every 28 days Insert vaginally and leave in place for 3 consecutive weeks, then remove for 1 week. • [DISCONTINUED] amphetamine-dextroamphetamine (ADDERALL) 5 MG tablet Take 1 tablet (5 mg total) by mouth 2 (two) times a day Max Daily Amount: 10 mg     No Known Allergies  Immunization History   Administered Date(s) Administered   • COVID-19 PFIZER VACCINE 0.3 ML IM 04/20/2021, 05/28/2021       Objective     /70 (BP Location: Right arm, Patient Position: Sitting, Cuff Size: Standard)   Pulse 62   Ht 5' 7" (1.702 m)   Wt 68.9 kg (152 lb)   SpO2 99%   BMI 23.81 kg/m²     Physical Exam  Vitals and nursing note reviewed. Constitutional:       Appearance: Normal appearance. Cardiovascular:      Rate and Rhythm: Normal rate and regular rhythm. Heart sounds: Normal heart sounds. Pulmonary:      Effort: Pulmonary effort is normal.      Breath sounds: Wheezing present. Comments: Minimal wheeze noted  Musculoskeletal:      Cervical back: Normal range of motion and neck supple. Right lower leg: No edema. Left lower leg: No edema. Neurological:      Mental Status: She is alert.        Toni Kim MD

## 2023-08-22 NOTE — PROGRESS NOTES
ANNUAL      Name: Carlos Ortega      : 2005      MRN: [id number]  Encounter Provider: Ritesh Long MD  Encounter Date: 2023   Encounter department: [address] [place] [address]     {There are no diagnoses linked to this encounter. (Refresh or delete this SmartLink)}       Subjective     HPI  Review of Systems    Past Medical History:   Diagnosis Date   • Asthma    • Iron deficiency anemia secondary to inadequate dietary iron intake      No past surgical history on file.   Family History   Problem Relation Age of Onset   • Diabetes Mother    • Hyperlipidemia Mother    • Breast cancer Maternal Aunt    • Breast cancer Maternal Aunt      Social History     Socioeconomic History   • Marital status: Single     Spouse name: Not on file   • Number of children: Not on file   • Years of education: Not on file   • Highest education level: Not on file   Occupational History   • Not on file   Tobacco Use   • Smoking status: Never     Passive exposure: Never   • Smokeless tobacco: Never   Vaping Use   • Vaping Use: Former   • Substances: Nicotine, Flavoring   Substance and Sexual Activity   • Alcohol use: Not Currently     Comment: special occasions   • Drug use: Never   • Sexual activity: Yes     Partners: Male     Birth control/protection: Condom Male, Ring   Other Topics Concern   • Not on file   Social History Narrative   • Not on file     Social Determinants of Health     Financial Resource Strain: Not on file   Food Insecurity: Not on file   Transportation Needs: Not on file   Physical Activity: Not on file   Stress: Not on file   Social Connections: Not on file   Intimate Partner Violence: Not on file   Housing Stability: Not on file     Current Outpatient Medications on File Prior to Visit   Medication Sig   • albuterol (PROVENTIL HFA,VENTOLIN HFA) 90 mcg/act inhaler Inhale 2 puffs every 6 (six) hours as needed for wheezing   • amphetamine-dextroamphetamine (ADDERALL) 5 MG tablet Take 1 tablet (5 mg total) by mouth 2 (two) times a day Max Daily Amount: 10 mg   • budesonide (Pulmicort Flexhaler) 180 MCG/ACT inhaler Inhale 1 puff 2 (two) times a day Rinse mouth after use. • etonogestrel-ethinyl estradiol (EluRyng) 0.12-0.015 MG/24HR vaginal ring Insert 1 each into the vagina every 28 days Insert vaginally and leave in place for 3 consecutive weeks, then remove for 1 week. No Known Allergies  Immunization History   Administered Date(s) Administered   • COVID-19 PFIZER VACCINE 0.3 ML IM 04/20/2021, 05/28/2021       Objective     There were no vitals taken for this visit.     Physical Exam  Violetta Raphael MD

## 2023-08-23 ENCOUNTER — OFFICE VISIT (OUTPATIENT)
Dept: FAMILY MEDICINE CLINIC | Facility: CLINIC | Age: 18
End: 2023-08-23
Payer: COMMERCIAL

## 2023-08-23 VITALS
BODY MASS INDEX: 23.86 KG/M2 | HEART RATE: 62 BPM | DIASTOLIC BLOOD PRESSURE: 70 MMHG | HEIGHT: 67 IN | SYSTOLIC BLOOD PRESSURE: 110 MMHG | WEIGHT: 152 LBS | OXYGEN SATURATION: 99 %

## 2023-08-23 DIAGNOSIS — F90.2 ATTENTION DEFICIT HYPERACTIVITY DISORDER (ADHD), COMBINED TYPE: ICD-10-CM

## 2023-08-23 DIAGNOSIS — B34.9 VIRAL SYNDROME: Primary | ICD-10-CM

## 2023-08-23 DIAGNOSIS — J45.20 MILD INTERMITTENT ASTHMA WITHOUT COMPLICATION: ICD-10-CM

## 2023-08-23 DIAGNOSIS — R73.03 PREDIABETES: ICD-10-CM

## 2023-08-23 PROCEDURE — 99214 OFFICE O/P EST MOD 30 MIN: CPT | Performed by: INTERNAL MEDICINE

## 2023-08-23 RX ORDER — PREDNISONE 20 MG/1
20 TABLET ORAL DAILY
Qty: 5 TABLET | Refills: 0 | Status: SHIPPED | OUTPATIENT
Start: 2023-08-23

## 2023-08-23 RX ORDER — AZITHROMYCIN 250 MG/1
TABLET, FILM COATED ORAL
Qty: 6 TABLET | Refills: 0 | Status: SHIPPED | OUTPATIENT
Start: 2023-08-23 | End: 2023-08-28

## 2023-08-23 RX ORDER — DEXTROAMPHETAMINE SACCHARATE, AMPHETAMINE ASPARTATE, DEXTROAMPHETAMINE SULFATE AND AMPHETAMINE SULFATE 2.5; 2.5; 2.5; 2.5 MG/1; MG/1; MG/1; MG/1
10 TABLET ORAL
Qty: 60 TABLET | Refills: 0 | Status: SHIPPED | OUTPATIENT
Start: 2023-08-23

## 2023-08-23 NOTE — ASSESSMENT & PLAN NOTE
As mentioned above patient with viral syndrome symptoms getting better but still cough present with yellowish phlegm we will give her prednisone if necessary Zithromax.

## 2023-08-23 NOTE — ASSESSMENT & PLAN NOTE
Patient with history of asthmatic bronchitis recently flulike symptoms was on prednisone and albuterol inhaler symptoms still persisting we will give her another 5 days of prednisone patient may need antibiotic Zithromax also.

## 2023-08-23 NOTE — ASSESSMENT & PLAN NOTE
Patient with A1c of 5.7 we will go for repeat blood test however I recommended not to go at this time because of being on prednisone she can give like 4 weeks time after finishing the course to go for the blood test.

## 2023-08-23 NOTE — ASSESSMENT & PLAN NOTE
Patient with attention deficit disorder we will increase the dose of the Adderall to 10 mg twice a day and follow-up with a psychiatrist in [place]

## 2023-08-25 ENCOUNTER — ANNUAL EXAM (OUTPATIENT)
Dept: OBGYN CLINIC | Facility: CLINIC | Age: 18
End: 2023-08-25
Payer: COMMERCIAL

## 2023-08-25 VITALS
BODY MASS INDEX: 24.14 KG/M2 | WEIGHT: 153.8 LBS | DIASTOLIC BLOOD PRESSURE: 72 MMHG | SYSTOLIC BLOOD PRESSURE: 100 MMHG | HEIGHT: 67 IN

## 2023-08-25 DIAGNOSIS — Z30.44 ENCOUNTER FOR SURVEILLANCE OF VAGINAL RING HORMONAL CONTRACEPTIVE DEVICE: ICD-10-CM

## 2023-08-25 DIAGNOSIS — Z11.3 ROUTINE SCREENING FOR STI (SEXUALLY TRANSMITTED INFECTION): ICD-10-CM

## 2023-08-25 DIAGNOSIS — Z01.419 WOMEN'S ANNUAL ROUTINE GYNECOLOGICAL EXAMINATION: Primary | ICD-10-CM

## 2023-08-25 PROCEDURE — 87491 CHLMYD TRACH DNA AMP PROBE: CPT | Performed by: OBSTETRICS & GYNECOLOGY

## 2023-08-25 PROCEDURE — 99385 PREV VISIT NEW AGE 18-39: CPT | Performed by: OBSTETRICS & GYNECOLOGY

## 2023-08-25 PROCEDURE — 87591 N.GONORRHOEAE DNA AMP PROB: CPT | Performed by: OBSTETRICS & GYNECOLOGY

## 2023-08-25 RX ORDER — ETONOGESTREL AND ETHINYL ESTRADIOL 11.7; 2.7 MG/1; MG/1
1 INSERT, EXTENDED RELEASE VAGINAL
Qty: 3 EACH | Refills: 3 | Status: SHIPPED | OUTPATIENT
Start: 2023-08-25

## 2023-08-25 NOTE — PROGRESS NOTES
Susan Jovel is a 19 y.o. female who presents for annual well woman exam. Periods are regular every 28-30 days, lasting 5 days. No intermenstrual bleeding, spotting, or discharge. Current contraception: desires to restart Nuva ring  Family history of uterine or ovarian cancer: no    Family history of breast cancer: yes - maternal aunt, maternal cousin     Menstrual History:  OB History        0    Para   0    Term   0       0    AB   0    Living   0       SAB   0    IAB   0    Ectopic   0    Multiple   0    Live Births   0                  Patient's last menstrual period was 2023 (approximate). Period Cycle (Days): 28  Period Duration (Days): 5  Period Pattern: Regular  Menstrual Flow: Heavy, Moderate, Light  Dysmenorrhea: (!) Moderate  Dysmenorrhea Symptoms: Other (Comment) (lower back pain)    The following portions of the patient's history were reviewed and updated as appropriate: allergies, current medications, past family history, past medical history, past social history, past surgical history and problem list.    Review of Systems  Review of Systems   Constitutional: Negative for activity change, appetite change, chills, fatigue and fever. Respiratory: Negative for apnea, cough, chest tightness and shortness of breath. Cardiovascular: Negative for chest pain, palpitations and leg swelling. Gastrointestinal: Negative for abdominal pain, constipation, diarrhea, nausea and vomiting. Genitourinary: Negative for difficulty urinating, dysuria, flank pain, frequency, hematuria and urgency. Neurological: Negative for dizziness, seizures, syncope, light-headedness, numbness and headaches. Psychiatric/Behavioral: Negative for agitation and confusion.           Objective      /72 (BP Location: Left arm, Patient Position: Sitting, Cuff Size: Adult)   Ht 5' 7" (1.702 m)   Wt 69.8 kg (153 lb 12.8 oz)   LMP 2023 (Approximate)   BMI 24.09 kg/m²     Physical Exam  OBGyn Exam     General:   alert and oriented, in no acute distress, alert, appears stated age and cooperative   Heart: regular rate and rhythm, S1, S2 normal, no murmur, click, rub or gallop   Lungs: clear to auscultation bilaterally   Abdomen: soft, non-tender, without masses or organomegaly   Vulva: normal   Vagina: normal mucosa, normal discharge   Cervix: no cervical motion tenderness and no lesions   Uterus: normal size   Adnexa:  Breast Exam:  normal adnexa  breasts appear normal, no suspicious masses, no skin or nipple changes or axillary nodes. Assessment      @well woman@ . 49-year-old female  Annual exam  Desires NuvaRing for contraception was using it in the past  Family history of breast cancer mother to be tested for BRCA  Plan   GC/CT  Diet/exercise  Calcium/vitamin D  NuvaRing with a next menstrual cycle  Condom with sexual activity  Return to office for annual exam   All questions answered. There are no Patient Instructions on file for this visit.

## 2023-08-28 LAB
C TRACH DNA SPEC QL NAA+PROBE: NEGATIVE
N GONORRHOEA DNA SPEC QL NAA+PROBE: NEGATIVE

## 2023-08-29 ENCOUNTER — TELEPHONE (OUTPATIENT)
Dept: PSYCHIATRY | Facility: CLINIC | Age: 18
End: 2023-08-29

## 2023-08-29 NOTE — LETTER
Dear Severino Leger :    We are contacting you because your name is currently included on the [address] [place] [address] wait-list for Talk Therapy and/or Medication Management. (Please [place] which services are needed)     In our efforts to provide the highest quality care, [facility] has begun the process of upgrading our behavioral health systems to increase efficiency and expedite delivery of services. As part of this process, we ask you to please confirm your continued interest in the services above. If you are no longer interested or in need, please rosemarie “No” in the area below. If you are still interested and in need, please rosemarie “Yes” and provide your most current demographic and insurance information within 15 days. If we do not receive confirmation from you by 2023 your information will not be included in the system upgrade and your place on the waitlist will be lost.     Thank you in advance for your patience and understanding and we apologize for any inconvenience this may cause. Patient Name and :    Still in need of services: Yes or No     Current Address:     Phone#:     Best time to receive a call: Insurance Carrier:      Policy/ID#: Group#: Insurance Services Phone#:      What is your current presenting problem? Open to virtual talk therapy: Yes or No      We will call you to do an Intake when an appointment becomes available. You can send this information back to us in any of the ways below:    Email: [email or web address]). Jazmine Finger  Fax#:  [phone]  Mail:   [address] [place] [address], [address] [place] [address]

## 2024-01-08 ENCOUNTER — OFFICE VISIT (OUTPATIENT)
Dept: FAMILY MEDICINE CLINIC | Facility: CLINIC | Age: 19
End: 2024-01-08
Payer: COMMERCIAL

## 2024-01-08 VITALS
HEART RATE: 101 BPM | DIASTOLIC BLOOD PRESSURE: 70 MMHG | WEIGHT: 154.6 LBS | SYSTOLIC BLOOD PRESSURE: 110 MMHG | HEIGHT: 67 IN | BODY MASS INDEX: 24.27 KG/M2 | OXYGEN SATURATION: 98 %

## 2024-01-08 DIAGNOSIS — B37.9 YEAST INFECTION: ICD-10-CM

## 2024-01-08 DIAGNOSIS — E78.5 DYSLIPIDEMIA: ICD-10-CM

## 2024-01-08 DIAGNOSIS — J45.20 MILD INTERMITTENT ASTHMA WITHOUT COMPLICATION: ICD-10-CM

## 2024-01-08 DIAGNOSIS — R73.03 PREDIABETES: ICD-10-CM

## 2024-01-08 DIAGNOSIS — F90.2 ATTENTION DEFICIT HYPERACTIVITY DISORDER (ADHD), COMBINED TYPE: Primary | ICD-10-CM

## 2024-01-08 DIAGNOSIS — Z13.0 SCREENING FOR DEFICIENCY ANEMIA: ICD-10-CM

## 2024-01-08 DIAGNOSIS — Z13.29 SCREENING FOR THYROID DISORDER: ICD-10-CM

## 2024-01-08 DIAGNOSIS — N39.0 URINARY TRACT INFECTION WITHOUT HEMATURIA, SITE UNSPECIFIED: ICD-10-CM

## 2024-01-08 PROCEDURE — 99214 OFFICE O/P EST MOD 30 MIN: CPT | Performed by: INTERNAL MEDICINE

## 2024-01-08 RX ORDER — FLUCONAZOLE 150 MG/1
150 TABLET ORAL ONCE
Qty: 1 TABLET | Refills: 0 | Status: SHIPPED | OUTPATIENT
Start: 2024-01-08 | End: 2024-01-08

## 2024-01-08 RX ORDER — DEXTROAMPHETAMINE SACCHARATE, AMPHETAMINE ASPARTATE, DEXTROAMPHETAMINE SULFATE AND AMPHETAMINE SULFATE 2.5; 2.5; 2.5; 2.5 MG/1; MG/1; MG/1; MG/1
10 TABLET ORAL
Qty: 60 TABLET | Refills: 0 | Status: SHIPPED | OUTPATIENT
Start: 2024-01-08

## 2024-01-08 RX ORDER — ALBUTEROL SULFATE 90 UG/1
2 AEROSOL, METERED RESPIRATORY (INHALATION) EVERY 6 HOURS PRN
Qty: 8 G | Refills: 1 | Status: SHIPPED | OUTPATIENT
Start: 2024-01-08

## 2024-01-08 RX ORDER — SULFAMETHOXAZOLE AND TRIMETHOPRIM 800; 160 MG/1; MG/1
1 TABLET ORAL 2 TIMES DAILY
Qty: 10 TABLET | Refills: 0 | Status: SHIPPED | OUTPATIENT
Start: 2024-01-08 | End: 2024-01-13

## 2024-01-08 NOTE — PROGRESS NOTES
Name: Lyssa Teran      : 2005      MRN: [id number]  Encounter Provider: Mendoza Osborne MD  Encounter Date: 2024   Encounter department: [facility] [place]    Assessment & Plan     1. Attention deficit hyperactivity disorder (ADHD), combined type  Assessment & Plan:  Patient is currently taking Adderall 10 mg twice a day he is also being followed by the psychiatrist in [place] will continue with the same for now.    Orders:  -     amphetamine-dextroamphetamine (ADDERALL, 10MG,) 10 mg tablet; Take 1 tablet (10 mg total) by mouth 2 (two) times a day Max Daily Amount: 20 mg    2. Mild intermittent asthma without complication  Assessment & Plan:  Patient with history of asthmatic bronchitis in the past currently stable we will continue with the current management with as needed inhalers albuterol    Orders:  -     albuterol (PROVENTIL HFA,VENTOLIN HFA) 90 mcg/act inhaler; Inhale 2 puffs every 6 (six) hours as needed for wheezing    3. Prediabetes  Assessment & Plan:  Recommend patient to get the lab work done    Orders:  -     Comprehensive metabolic panel; Future  -     Hemoglobin A1C; Future; Expected date: 2024  -     UA w Reflex to Microscopic w Reflex to Culture; Future; Expected date: 2024    4. Yeast infection  -     fluconazole (DIFLUCAN) 150 mg tablet; Take 1 tablet (150 mg total) by mouth once for 1 dose    5. Urinary tract infection without hematuria, site unspecified  -     sulfamethoxazole-trimethoprim (BACTRIM DS) 800-160 mg per tablet; Take 1 tablet by mouth 2 (two) times a day for 5 days    6. Screening for thyroid disorder  -     TSH, 3rd generation with Free T4 reflex; Future; Expected date: 2024    7. Screening for deficiency anemia  -     CBC and differential; Future    8. Dyslipidemia  -     Lipid panel; Future           Subjective      Is coming here for evaluation regarding symptoms of attention deficit disorder also patient is now going to [place]  for 1 semester she already received the vaccinations recommend patient take typhoid and COVID also.  Patient denies any symptoms of cough congestion shortness of breath history of asthmatic bronchitis in the past.  Medications reviewed recommend patient to get some lab work also done.  At present time patient condition is stable no acute medical problems.  Will request records recommend patient drink more fluids and having inhalers on hand.      Review of Systems   Constitutional:  Negative for chills and fever.   HENT:  Negative for ear pain and sore throat.    Eyes:  Negative for pain and visual disturbance.   Respiratory:  Negative for cough and shortness of breath.    Cardiovascular:  Negative for chest pain and palpitations.   Gastrointestinal:  Negative for abdominal pain and vomiting.   Genitourinary:  Negative for dysuria and hematuria.   Musculoskeletal:  Negative for arthralgias and back pain.   Skin:  Negative for color change and rash.   Neurological:  Negative for seizures and syncope.   All other systems reviewed and are negative.      Current Outpatient Medications on File Prior to Visit   Medication Sig    etonogestrel-ethinyl estradiol (EluRyng) 0.12-0.015 MG/24HR vaginal ring Insert 1 each into the vagina every 28 days Insert vaginally and leave in place for 3 consecutive weeks, then remove for 1 week.    [DISCONTINUED] albuterol (PROVENTIL HFA,VENTOLIN HFA) 90 mcg/act inhaler Inhale 2 puffs every 6 (six) hours as needed for wheezing    [DISCONTINUED] amphetamine-dextroamphetamine (ADDERALL, 10MG,) 10 mg tablet Take 1 tablet (10 mg total) by mouth 2 (two) times a day Max Daily Amount: 20 mg    budesonide (Pulmicort Flexhaler) 180 MCG/ACT inhaler Inhale 1 puff 2 (two) times a day Rinse mouth after use. (Patient not taking: Reported on 8/25/2023)    predniSONE 20 mg tablet Take 1 tablet (20 mg total) by mouth daily (Patient not taking: Reported on 1/8/2024)       Objective     /70 (BP Location:  "Right arm, Patient Position: Sitting, Cuff Size: Standard)   Pulse 101   Ht 5' 7\" (1.702 m)   Wt 70.1 kg (154 lb 9.6 oz)   SpO2 98%   BMI 24.21 kg/m²     Physical Exam  Vitals and nursing note reviewed.   Constitutional:       Appearance: Normal appearance.   Cardiovascular:      Rate and Rhythm: Normal rate and regular rhythm.      Heart sounds: Normal heart sounds.   Pulmonary:      Effort: Pulmonary effort is normal.      Breath sounds: Normal breath sounds.   Musculoskeletal:      Cervical back: Normal range of motion and neck supple.      Right lower leg: No edema.      Left lower leg: No edema.   Skin:     General: Skin is warm and dry.   Neurological:      General: No focal deficit present.      Mental Status: She is alert and oriented to person, place, and time.     No results found for this or any previous visit (from the past 1344 hour(s)).   Mendoza Osborne MD    "

## 2024-01-08 NOTE — ASSESSMENT & PLAN NOTE
Patient is currently taking Adderall 10 mg twice a day he is also being followed by the psychiatrist in [place] will continue with the same for now.

## 2024-01-08 NOTE — ASSESSMENT & PLAN NOTE
Patient with history of asthmatic bronchitis in the past currently stable we will continue with the current management with as needed inhalers albuterol

## 2024-01-29 DIAGNOSIS — Z30.44 ENCOUNTER FOR SURVEILLANCE OF VAGINAL RING HORMONAL CONTRACEPTIVE DEVICE: ICD-10-CM

## 2024-01-30 RX ORDER — ETONOGESTREL AND ETHINYL ESTRADIOL VAGINAL RING .015; .12 MG/D; MG/D
1 RING VAGINAL
Qty: 3 EACH | Refills: 3 | Status: SHIPPED | OUTPATIENT
Start: 2024-01-30

## 2024-05-06 ENCOUNTER — NURSE TRIAGE (OUTPATIENT)
Age: 19
End: 2024-05-06

## 2024-05-06 ENCOUNTER — OFFICE VISIT (OUTPATIENT)
Dept: FAMILY MEDICINE CLINIC | Facility: CLINIC | Age: 19
End: 2024-05-06
Payer: COMMERCIAL

## 2024-05-06 VITALS
SYSTOLIC BLOOD PRESSURE: 112 MMHG | DIASTOLIC BLOOD PRESSURE: 68 MMHG | WEIGHT: 156.2 LBS | OXYGEN SATURATION: 100 % | TEMPERATURE: 98.5 F | BODY MASS INDEX: 24.52 KG/M2 | HEART RATE: 79 BPM | HEIGHT: 67 IN

## 2024-05-06 DIAGNOSIS — J45.20 MILD INTERMITTENT ASTHMATIC BRONCHITIS WITHOUT COMPLICATION: Primary | ICD-10-CM

## 2024-05-06 DIAGNOSIS — J45.20 MILD INTERMITTENT ASTHMA WITHOUT COMPLICATION: ICD-10-CM

## 2024-05-06 DIAGNOSIS — R73.03 PREDIABETES: ICD-10-CM

## 2024-05-06 DIAGNOSIS — F90.2 ATTENTION DEFICIT HYPERACTIVITY DISORDER (ADHD), COMBINED TYPE: ICD-10-CM

## 2024-05-06 PROCEDURE — 99213 OFFICE O/P EST LOW 20 MIN: CPT | Performed by: INTERNAL MEDICINE

## 2024-05-06 RX ORDER — DEXTROAMPHETAMINE SACCHARATE, AMPHETAMINE ASPARTATE, DEXTROAMPHETAMINE SULFATE AND AMPHETAMINE SULFATE 2.5; 2.5; 2.5; 2.5 MG/1; MG/1; MG/1; MG/1
10 TABLET ORAL
Qty: 60 TABLET | Refills: 0 | Status: SHIPPED | OUTPATIENT
Start: 2024-05-06

## 2024-05-06 RX ORDER — PREDNISONE 10 MG/1
TABLET ORAL
Qty: 18 TABLET | Refills: 0 | Status: SHIPPED | OUTPATIENT
Start: 2024-05-06

## 2024-05-06 RX ORDER — AZITHROMYCIN 250 MG/1
TABLET, FILM COATED ORAL
Qty: 6 TABLET | Refills: 0 | Status: SHIPPED | OUTPATIENT
Start: 2024-05-06 | End: 2024-05-11

## 2024-05-06 NOTE — PROGRESS NOTES
Office Visit Note  24     Lyssa Teran 18 y.o. female MRN: [id number]  : 2005    Assessment:     1. Mild intermittent asthmatic bronchitis without complication  Assessment & Plan:  As mentioned in HPI patient with cough congestion symptoms headache sore throat no wheezing lungs expiration slightly prolonged no wheezing.  Plan is to give her a Z-Gee Phenergan DM cough syrup if necessary prednisone tapering doses.  Patient has the inhaler to be used as needed    Orders:  -     azithromycin (ZITHROMAX) 250 mg tablet; Take 2 the first day, then take 1 daily    2. Attention deficit hyperactivity disorder (ADHD), combined type  Assessment & Plan:  Patient is on Adderall 10 mg twice a day he uses it as needed    Orders:  -     amphetamine-dextroamphetamine (ADDERALL, 10MG,) 10 mg tablet; Take 1 tablet (10 mg total) by mouth 2 (two) times a day Max Daily Amount: 20 mg    3. Mild intermittent asthma without complication  -     predniSONE 10 mg tablet; Prednisone 10 mg 1 tablet 3 times a day for 3 days followed by 1 tablet twice a day for 3 days followed by 1 tablet once a day for 3 days    4. Prediabetes  Assessment & Plan:  Follow-up with the lab work later.            Depression Screening and Follow-up Plan: Patient was screened for depression during today's encounter. They screened negative with a PHQ-2 score of 0.          Discussion Summary and Plan:  Today's care plan and medications were reviewed with patient in detail and all their questions answered to their satisfaction.    Chief Complaint   Patient presents with   • Cough   • Cold Like Symptoms      Subjective:  Patient is coming here for evaluation regarding symptoms of sore throat sinus congestion cough started yesterday.  History of asthma did not have any wheezing no shortness of breath at present time.  She has some chills but no fever as such.  Medication reviewed.  I have recommended lab work last visit going to get it done soon.        The  following portions of the patient's history were reviewed and updated as appropriate: allergies, current medications, past family history, past medical history, past social history, past surgical history and problem list.    Review of Systems   Constitutional:  Positive for chills. Negative for fever.   HENT:  Positive for congestion and sore throat. Negative for ear pain.    Eyes:  Negative for pain and visual disturbance.   Respiratory:  Positive for cough. Negative for shortness of breath.    Cardiovascular:  Negative for chest pain and palpitations.   Gastrointestinal:  Negative for abdominal pain and vomiting.   Genitourinary:  Negative for dysuria and hematuria.   Musculoskeletal:  Negative for arthralgias and back pain.   Skin:  Negative for color change and rash.   Neurological:  Positive for headaches. Negative for seizures and syncope.   All other systems reviewed and are negative.        Historical Information   Patient Active Problem List   Diagnosis   • Attention deficit hyperactivity disorder (ADHD), combined type   • Mild intermittent asthma without complication   • Prediabetes   • Asthmatic bronchitis without complication   • Viral syndrome     Past Medical History:   Diagnosis Date   • Asthma    • Iron deficiency anemia secondary to inadequate dietary iron intake      History reviewed. No pertinent surgical history.  Social History     Substance and Sexual Activity   Alcohol Use Yes    Comment: special occasions     Social History     Substance and Sexual Activity   Drug Use Never     Social History     Tobacco Use   Smoking Status Never   • Passive exposure: Never   Smokeless Tobacco Never     Family History   Problem Relation Age of Onset   • Diabetes Mother    • Hyperlipidemia Mother    • Breast cancer Maternal Aunt    • Breast cancer Maternal Aunt      Health Maintenance Due   Topic   • Hepatitis C Screening    • Pneumococcal Vaccine: Pediatrics (0 to 5 Years) and At-Risk Patients (6 to 64  "Years) (1 of 2 - PCV)   • DTaP,Tdap,and Td Vaccines (1 - Tdap)   • HIV Screening    • HPV Vaccine (1 - 3-dose series)   • COVID-19 Vaccine (3 - 2023-24 season)      Meds/Allergies       Current Outpatient Medications:   •  albuterol (PROVENTIL HFA,VENTOLIN HFA) 90 mcg/act inhaler, Inhale 2 puffs every 6 (six) hours as needed for wheezing, Disp: 8 g, Rfl: 1  •  amphetamine-dextroamphetamine (ADDERALL, 10MG,) 10 mg tablet, Take 1 tablet (10 mg total) by mouth 2 (two) times a day Max Daily Amount: 20 mg, Disp: 60 tablet, Rfl: 0  •  azithromycin (ZITHROMAX) 250 mg tablet, Take 2 the first day, then take 1 daily, Disp: 6 tablet, Rfl: 0  •  etonogestrel-ethinyl estradiol (EluRyng) 0.12-0.015 MG/24HR vaginal ring, Insert 1 each into the vagina every 28 days Insert vaginally and leave in place for 3 consecutive weeks, then remove for 1 week., Disp: 3 each, Rfl: 3  •  predniSONE 10 mg tablet, Prednisone 10 mg 1 tablet 3 times a day for 3 days followed by 1 tablet twice a day for 3 days followed by 1 tablet once a day for 3 days, Disp: 18 tablet, Rfl: 0      Objective:    Vitals:   /68 (BP Location: Right arm, Patient Position: Sitting, Cuff Size: Standard)   Pulse 79   Temp 98.5 °F (36.9 °C)   Ht 5' 7\" (1.702 m)   Wt 70.9 kg (156 lb 3.2 oz)   SpO2 100%   BMI 24.46 kg/m²   Body mass index is 24.46 kg/m².  Vitals:    05/06/24 1609   Weight: 70.9 kg (156 lb 3.2 oz)       Physical Exam  Vitals and nursing note reviewed.   Constitutional:       Appearance: Normal appearance.   Cardiovascular:      Rate and Rhythm: Normal rate and regular rhythm.      Heart sounds: Normal heart sounds.   Pulmonary:      Effort: Pulmonary effort is normal.      Breath sounds: Normal breath sounds.   Abdominal:      General: Abdomen is flat.      Palpations: Abdomen is soft.   Musculoskeletal:         General: Normal range of motion.      Cervical back: Normal range of motion and neck supple.      Right lower leg: No edema.      Left " "lower leg: No edema.   Skin:     General: Skin is warm and dry.   Neurological:      Mental Status: She is alert and oriented to person, place, and time.   Psychiatric:         Mood and Affect: Mood normal.         Behavior: Behavior normal.         Lab Review   No visits with results within 2 Month(s) from this visit.   Latest known visit with results is:   Annual Exam on 08/25/2023   Component Date Value Ref Range Status   • N gonorrhoeae, DNA Probe 08/25/2023 Negative  Negative Final   • Chlamydia trachomatis, DNA Probe 08/25/2023 Negative  Negative Final         Mendoza Osborne MD        \"This note has been constructed using a voice recognition system.Therefore there may be syntax, spelling, and/or grammatical errors. Please call if you have any questions. \"  "

## 2024-05-06 NOTE — ASSESSMENT & PLAN NOTE
As mentioned in HPI patient with cough congestion symptoms headache sore throat no wheezing lungs expiration slightly prolonged no wheezing.  Plan is to give her a Z-Gee Phenergan DM cough syrup if necessary prednisone tapering doses.  Patient has the inhaler to be used as needed

## 2024-05-09 NOTE — TELEPHONE ENCOUNTER
"Patient calling with onset of re-occurring Yeast infections after periods. Spent the recent semester abroad in [place] where symptoms returned. States experiencing cottage-cheese like vaginal discharge, vaginal discomfort and itching, along with an odor. RN advised can send Diflucan to pharmacy. Pt stated she's had Diflucan in the past and it worked, however, Yeast symptoms keep returning. RN advised will see if provider would like to prescribe a different regimen of diflucan, or have pt be seen in office for further follow up. Pharmacy and phone number on file verified. Pt verbalized an understanding. No further questions at this time.     Reason for Disposition  • Symptoms of a yeast infection (i.e., itchy, white discharge, not bad smelling) and feels like prior vaginal yeast infections    Answer Assessment - Initial Assessment Questions  1. SYMPTOM: \"What's the main symptom you're concerned about?\" (e.g., pain, itching, dryness)      Vaginal discharge that is white, cottage cheese-like, odor, itchy  2. LOCATION: \"Where is the  s/s located?\" (e.g., inside/outside, left/right)      Outside  3. ONSET: \"When did the  s/s  start?\"      Noted this past semester when was abroad.   4. PAIN: \"Is there any pain?\" If Yes, ask: \"How bad is it?\" (Scale: 1-10; mild, moderate, severe)      2/10  5. ITCHING: \"Is there any itching?\" If Yes, ask: \"How bad is it?\" (Scale: 1-10; mild, moderate, severe)      2/10  6. CAUSE: \"What do you think is causing the discharge?\" \"Have you had the same problem before? What happened then?\"      Yeast  7. OTHER SYMPTOMS: \"Do you have any other symptoms?\" (e.g., fever, itching, vaginal bleeding, pain with urination, injury to genital area, vaginal foreign body)      Denies  8. PREGNANCY: \"Is there any chance you are pregnant?\" \"When was your last menstrual period?\"      Denies    Protocols used: Vaginal Symptoms-ADULT-[place]    "
Patient last seen 08/2023, no vaginitis cultures on file. Lvm for patient to return call to schedule an appt to be seen.   
Pt has appointment scheduled to be seen.  
n/a

## 2024-05-30 ENCOUNTER — OFFICE VISIT (OUTPATIENT)
Dept: OBGYN CLINIC | Facility: CLINIC | Age: 19
End: 2024-05-30
Payer: COMMERCIAL

## 2024-05-30 VITALS
HEIGHT: 67 IN | SYSTOLIC BLOOD PRESSURE: 112 MMHG | BODY MASS INDEX: 24.3 KG/M2 | DIASTOLIC BLOOD PRESSURE: 60 MMHG | WEIGHT: 154.8 LBS

## 2024-05-30 DIAGNOSIS — N89.8 VAGINAL DISCHARGE: Primary | ICD-10-CM

## 2024-05-30 DIAGNOSIS — Z11.3 SCREEN FOR STD (SEXUALLY TRANSMITTED DISEASE): ICD-10-CM

## 2024-05-30 PROCEDURE — 87491 CHLMYD TRACH DNA AMP PROBE: CPT | Performed by: OBSTETRICS & GYNECOLOGY

## 2024-05-30 PROCEDURE — 87480 CANDIDA DNA DIR PROBE: CPT | Performed by: OBSTETRICS & GYNECOLOGY

## 2024-05-30 PROCEDURE — 87591 N.GONORRHOEAE DNA AMP PROB: CPT | Performed by: OBSTETRICS & GYNECOLOGY

## 2024-05-30 PROCEDURE — 99213 OFFICE O/P EST LOW 20 MIN: CPT | Performed by: OBSTETRICS & GYNECOLOGY

## 2024-05-30 PROCEDURE — 87510 GARDNER VAG DNA DIR PROBE: CPT | Performed by: OBSTETRICS & GYNECOLOGY

## 2024-05-30 PROCEDURE — 87660 TRICHOMONAS VAGIN DIR PROBE: CPT | Performed by: OBSTETRICS & GYNECOLOGY

## 2024-05-30 NOTE — PROGRESS NOTES
"Assessment/Plan:     There are no diagnoses linked to this encounter.      19-year-old female  Recurrent vaginal discharge  NuvaRing contraception  Plan  GC/CT/affirm  Female hygiene reviewed and discussed with patient  Will call patient with result  Return to office for annual exam    Subjective:      Patient ID: Lyssa Teran is a 19 y.o. female.    20 yo female   C/o recurrent discharge using Nuva ring   Also using condom for contraception   Vaginal Discharge  The patient's primary symptoms include vaginal discharge. This is a recurrent problem. The current episode started in the past 7 days. The problem occurs intermittently. The problem has been waxing and waning. The patient is experiencing no pain. Pertinent negatives include no abdominal pain, back pain, constipation, diarrhea, dysuria, fever, nausea, painful intercourse or urgency. The vaginal discharge was white. There has been no bleeding. She has not been passing clots. Exacerbated by: antibiotics used. She has tried antifungals for the symptoms. The treatment provided mild relief. She is sexually active. She uses condoms and a contraceptive ring for contraception.       The following portions of the patient's history were reviewed and updated as appropriate: allergies, current medications, past family history, past medical history, past social history, past surgical history and problem list.    Review of Systems   Constitutional:  Negative for fever.   Gastrointestinal:  Negative for abdominal pain, constipation, diarrhea and nausea.   Genitourinary:  Positive for vaginal discharge. Negative for dysuria and urgency.   Musculoskeletal:  Negative for back pain.         Objective:      /60 (BP Location: Left arm, Patient Position: Sitting, Cuff Size: Adult)   Ht 5' 7\" (1.702 m)   Wt 70.2 kg (154 lb 12.8 oz)   LMP 05/11/2024 (Exact Date)   BMI 24.25 kg/m²          Physical Exam  Constitutional:       Appearance: Normal appearance.   Neurological:    "   General: No focal deficit present.      Mental Status: She is alert and oriented to person, place, and time.   Psychiatric:         Mood and Affect: Mood normal.         Behavior: Behavior normal.

## 2024-05-31 DIAGNOSIS — B96.89 BV (BACTERIAL VAGINOSIS): Primary | ICD-10-CM

## 2024-05-31 DIAGNOSIS — N76.0 BV (BACTERIAL VAGINOSIS): Primary | ICD-10-CM

## 2024-05-31 LAB
C TRACH DNA SPEC QL NAA+PROBE: NEGATIVE
CANDIDA RRNA VAG QL PROBE: NOT DETECTED
G VAGINALIS RRNA GENITAL QL PROBE: DETECTED
N GONORRHOEA DNA SPEC QL NAA+PROBE: NEGATIVE
T VAGINALIS RRNA GENITAL QL PROBE: NOT DETECTED

## 2024-05-31 RX ORDER — METRONIDAZOLE 500 MG/1
500 TABLET ORAL EVERY 12 HOURS SCHEDULED
Qty: 14 TABLET | Refills: 0 | Status: SHIPPED | OUTPATIENT
Start: 2024-05-31 | End: 2024-06-07

## 2024-06-07 NOTE — PROGRESS NOTES
Office Visit Note  06/10/24     Lyssa Teran 19 y.o. female MRN: [id number]  : 2005    Assessment:     1. Attention deficit hyperactivity disorder (ADHD), combined type  Assessment & Plan:  Continue Adderall will change it to long-acting 20 mg once a day  Orders:  -     amphetamine-dextroamphetamine (ADDERALL XR, 20MG,) 20 MG 24 hr capsule; Take 1 capsule (20 mg total) by mouth every morning Max Daily Amount: 20 mg  2. Prediabetes  Assessment & Plan:  Lab reveals hemoglobin A1c at 5.9 rest of the labs are unremarkable cholesterol at 191 HDL 75 triglycerides 94 LDL at 97.  Recommend very strongly to follow strict diet especially with regards to the sugary drinks carbohydrates test to be multigrain whole-wheat avoid starchy foods.  Strong family history of diabetes  3. Mild intermittent asthma without complication  Assessment & Plan:  Patient with a history of asthma no recent episodes of acute attacks uses the inhaler as needed albuterol  4. Encounter for surveillance of vaginal ring hormonal contraceptive device             Discussion Summary and Plan:  Today's care plan and medications were reviewed with patient in detail and all their questions answered to their satisfaction.    Chief Complaint   Patient presents with   • Follow-up      Subjective:  Patient is coming for evaluation with regards to symptoms of attention deficit disorder also her hemoglobin A1c came back at 5.9 with a strong family history of diabetes.  Patient also with a history of asthmatic bronchitis stable at present time.  She uses the inhaler only when she is having an attack of asthma asthmatic bronchitis.  Medications reviewed labs reviewed patient is on Adderall we have been giving her 10 mg twice a day fast acting we will change it to the extended release 20 mg once a day        The following portions of the patient's history were reviewed and updated as appropriate: allergies, current medications, past family history, past  medical history, past social history, past surgical history and problem list.    Review of Systems   Constitutional:  Negative for chills and fever.   HENT:  Negative for ear pain and sore throat.    Eyes:  Negative for pain and visual disturbance.   Respiratory:  Negative for cough and shortness of breath.    Cardiovascular:  Negative for chest pain and palpitations.   Gastrointestinal:  Negative for abdominal pain and vomiting.   Genitourinary:  Negative for dysuria and hematuria.   Musculoskeletal:  Negative for arthralgias and back pain.   Skin:  Negative for color change and rash.   Neurological:  Negative for seizures and syncope.   All other systems reviewed and are negative.        Historical Information   Patient Active Problem List   Diagnosis   • Attention deficit hyperactivity disorder (ADHD), combined type   • Mild intermittent asthma without complication   • Prediabetes   • Asthmatic bronchitis without complication   • Viral syndrome     Past Medical History:   Diagnosis Date   • Asthma    • Iron deficiency anemia secondary to inadequate dietary iron intake      History reviewed. No pertinent surgical history.  Social History     Substance and Sexual Activity   Alcohol Use Yes   • Alcohol/week: 4.0 standard drinks of alcohol   • Types: 4 Cans of beer per week    Comment: special occasions     Social History     Substance and Sexual Activity   Drug Use Never     Social History     Tobacco Use   Smoking Status Never   • Passive exposure: Never   Smokeless Tobacco Never     Family History   Problem Relation Age of Onset   • Diabetes Mother    • Hyperlipidemia Mother    • Breast cancer Maternal Aunt    • Breast cancer Maternal Aunt    • Hyperlipidemia Father      Health Maintenance Due   Topic   • Hepatitis C Screening    • Pneumococcal Vaccine: Pediatrics (0 to 5 Years) and At-Risk Patients (6 to 64 Years) (1 of 2 - PCV)   • HIV Screening    • HPV Vaccine (1 - 3-dose series)   • COVID-19 Vaccine (3 -  "2023-24 season)   • DTaP,Tdap,and Td Vaccines (1 - Tdap)   • Cervical Cancer Screening    • Annual Physical    • Influenza Vaccine (Season Ended)      Meds/Allergies       Current Outpatient Medications:   •  albuterol (PROVENTIL HFA,VENTOLIN HFA) 90 mcg/act inhaler, Inhale 2 puffs every 6 (six) hours as needed for wheezing, Disp: 8 g, Rfl: 1  •  amphetamine-dextroamphetamine (ADDERALL XR, 20MG,) 20 MG 24 hr capsule, Take 1 capsule (20 mg total) by mouth every morning Max Daily Amount: 20 mg, Disp: 30 capsule, Rfl: 0  •  etonogestrel-ethinyl estradiol (EluRyng) 0.12-0.015 MG/24HR vaginal ring, Insert 1 each into the vagina every 28 days Insert vaginally and leave in place for 3 consecutive weeks, then remove for 1 week., Disp: 3 each, Rfl: 3  •  predniSONE 10 mg tablet, Prednisone 10 mg 1 tablet 3 times a day for 3 days followed by 1 tablet twice a day for 3 days followed by 1 tablet once a day for 3 days, Disp: 18 tablet, Rfl: 0      Objective:    Vitals:   /68 (BP Location: Right arm, Patient Position: Sitting, Cuff Size: Standard)   Pulse 75   Ht 5' 7\" (1.702 m)   Wt 71.4 kg (157 lb 6.4 oz)   LMP 05/11/2024 (Exact Date)   SpO2 99%   BMI 24.65 kg/m²   Body mass index is 24.65 kg/m².  Vitals:    06/10/24 1622   Weight: 71.4 kg (157 lb 6.4 oz)       Physical Exam  Vitals and nursing note reviewed.   Constitutional:       Appearance: Normal appearance.   Cardiovascular:      Rate and Rhythm: Normal rate and regular rhythm.      Heart sounds: Normal heart sounds.   Pulmonary:      Effort: Pulmonary effort is normal.      Breath sounds: Normal breath sounds.   Abdominal:      Palpations: Abdomen is soft.   Musculoskeletal:      Cervical back: Normal range of motion and neck supple.      Right lower leg: No edema.      Left lower leg: No edema.   Skin:     General: Skin is warm and dry.   Neurological:      General: No focal deficit present.      Mental Status: She is alert and oriented to person, place, and " time.   Psychiatric:         Mood and Affect: Mood normal.         Behavior: Behavior normal.         Lab Review   Orders Only on 06/06/2024   Component Date Value Ref Range Status   • Total Cholesterol 06/06/2024 191  <200 mg/dL Final   • HDL 06/06/2024 75  > OR = 50 mg/dL Final   • Triglycerides 06/06/2024 94  <150 mg/dL Final   • LDL Calculated 06/06/2024 97  mg/dL (calc) Final    Comment: Reference range: <100     Desirable range <100 mg/dL for primary prevention;    <70 mg/dL for patients with CHD or diabetic patients   with > or = 2 CHD risk factors.     LDL-C is now calculated using the Jhoan-Giang   calculation, which is a validated novel method providing   better accuracy than the Friedewald equation in the   estimation of LDL-C.   Jhoan SS et al. ANCA. 2013;310(19): 9706-8667   (http://education.[employer]/faq/EUX828)     • Chol HDLC Ratio 06/06/2024 2.5  <5.0 (calc) Final   • Non-HDL Cholesterol 06/06/2024 116  <130 mg/dL (calc) Final    Comment: For patients with diabetes plus 1 major ASCVD risk   factor, treating to a non-HDL-C goal of <100 mg/dL   (LDL-C of <70 mg/dL) is considered a therapeutic   option.     • Glucose, Random 06/06/2024 89  65 - 99 mg/dL Final    Comment:               Fasting reference interval        • BUN 06/06/2024 11  7 - 25 mg/dL Final   • Creatinine 06/06/2024 0.76  0.50 - 0.96 mg/dL Final   • eGFR 06/06/2024 114  > OR = 60 mL/min/1.73m2 Final   • SL AMB BUN/CREATININE RATIO 06/06/2024 SEE NOTE:  6 - 22 (calc) Final    Comment:    Not Reported: BUN and Creatinine are within     reference range.           • Sodium 06/06/2024 137  135 - 146 mmol/L Final   • Potassium 06/06/2024 4.2  3.5 - 5.3 mmol/L Final   • Chloride 06/06/2024 102  98 - 110 mmol/L Final   • CO2 06/06/2024 26  20 - 32 mmol/L Final   • Calcium 06/06/2024 9.3  8.6 - 10.2 mg/dL Final   • Protein, Total 06/06/2024 6.8  6.1 - 8.1 g/dL Final   • Albumin 06/06/2024 4.4  3.6 - 5.1 g/dL Final   • Globulin  06/06/2024 2.4  1.9 - 3.7 g/dL (calc) Final   • Albumin/Globulin Ratio 06/06/2024 1.8  1.0 - 2.5 (calc) Final   • TOTAL BILIRUBIN 06/06/2024 1.0  0.2 - 1.2 mg/dL Final   • Alkaline Phosphatase 06/06/2024 55  31 - 125 U/L Final   • AST 06/06/2024 21  10 - 30 U/L Final   • ALT 06/06/2024 23  6 - 29 U/L Final   • Color UA 06/06/2024 DARK YELLOW  YELLOW Final   • Urine Appearance 06/06/2024 CLEAR  CLEAR Final   • Specific Gravity 06/06/2024 1.025  1.001 - 1.035 Final   • Ph 06/06/2024 6.0  5.0 - 8.0 Final   • Glucose, Urine 06/06/2024 NEGATIVE  NEGATIVE Final   • Bilirubin, Urine 06/06/2024 NEGATIVE  NEGATIVE Final   • Ketone, Urine 06/06/2024 TRACE (A)  NEGATIVE Final   • Blood, Urine 06/06/2024 NEGATIVE  NEGATIVE Final   • Protein, Urine 06/06/2024 TRACE (A)  NEGATIVE Final   • Nitrites Urine 06/06/2024 POSITIVE (A)  NEGATIVE Final   • Leukocyte Esterase 06/06/2024 1+ (A)  NEGATIVE Final   • SL AMB WBC, URINE 06/06/2024 NONE SEEN  < OR = 5 /HPF Final   • RBC, Urine 06/06/2024 0-2  < OR = 2 /HPF Final   • Squamous Epithelial Cells 06/06/2024 6-10 (A)  < OR = 5 /HPF Final   • Bacteria, UA 06/06/2024 FEW (A)  NONE SEEN /HPF Final   • Hyaline Casts 06/06/2024 NONE SEEN  NONE SEEN /LPF Final   • Comment(s) 06/06/2024    Final    Comment: This urine was analyzed for the presence of WBC,   RBC, bacteria, casts, and other formed elements.   Only those elements seen were reported.           • White Blood Cell Count 06/06/2024 7.4  3.8 - 10.8 Thousand/uL Final   • Red Blood Cell Count 06/06/2024 5.12 (H)  3.80 - 5.10 Million/uL Final   • Hemoglobin 06/06/2024 13.1  11.7 - 15.5 g/dL Final   • HCT 06/06/2024 40.4  35.0 - 45.0 % Final   • MCV 06/06/2024 78.9 (L)  80.0 - 100.0 fL Final   • MCH 06/06/2024 25.6 (L)  27.0 - 33.0 pg Final   • MCHC 06/06/2024 32.4  32.0 - 36.0 g/dL Final   • RDW 06/06/2024 14.2  11.0 - 15.0 % Final   • Platelet Count 06/06/2024 279  140 - 400 Thousand/uL Final   • SL AMB MPV 06/06/2024 10.7  7.5 - 12.5  fL Final   • Neutrophils (Absolute) 06/06/2024 4,521  1,500 - 7,800 cells/uL Final   • Lymphocytes (Absolute) 06/06/2024 2,227  850 - 3,900 cells/uL Final   • Monocytes (Absolute) 06/06/2024 533  200 - 950 cells/uL Final   • Eosinophils (Absolute) 06/06/2024 89  15 - 500 cells/uL Final   • Basophils ABS 06/06/2024 30  0 - 200 cells/uL Final   • Neutrophils 06/06/2024 61.1  % Final   • Lymphocytes 06/06/2024 30.1  % Final   • Monocytes 06/06/2024 7.2  % Final   • Eosinophils 06/06/2024 1.2  % Final   • Basophils PCT 06/06/2024 0.4  % Final   • TSH W/RFX TO FREE T4 06/06/2024 1.05  mIU/L Final    Comment:           Reference Range                         > or = 20 Years  0.40-4.50                              Pregnancy Ranges            First trimester    0.26-2.66            Second trimester   0.55-2.73            Third trimester    0.43-2.91     • Hemoglobin A1C 06/06/2024 5.9 (H)  <5.7 % of total Hgb Final    Comment: For someone without known diabetes, a hemoglobin   A1c value between 5.7% and 6.4% is consistent with  prediabetes and should be confirmed with a   follow-up test.     For someone with known diabetes, a value <7%  indicates that their diabetes is well controlled. A1c  targets should be individualized based on duration of  diabetes, age, comorbid conditions, and other  considerations.     This assay result is consistent with an increased risk  of diabetes.     Currently, no consensus exists regarding use of  hemoglobin A1c for diagnosis of diabetes for children.        • Urine Culture, Comprehensive 06/06/2024    Final    CULTURE INDICATED - RESULTS TO FOLLOW   • Urine Culture Result 06/06/2024    Final    Comment:     CULTURE, URINE, ROUTINE         Micro Number:      [id number]    Test Status:       Final    Specimen Source:   Urine    Specimen Quality:  Adequate    Result:            Less than 10,000 CFU/mL of single Gram positive                       organism isolated. No further testing will be      "                  performed. If clinically indicated, recollection                       using a method to minimize contamination, with                       prompt transfer to Urine Culture Transport Tube,                       is recommended.     Office Visit on 05/30/2024   Component Date Value Ref Range Status   • N gonorrhoeae, DNA Probe 05/30/2024 Negative  Negative Final   • Chlamydia trachomatis, DNA Probe 05/30/2024 Negative  Negative Final   • Trichomonas vaginalis 05/30/2024 Not Detected  Not Detected Final   • Gardnerella vaginalis 05/30/2024 Detected (A)  Not Detected Final   • Sabrina Species 05/30/2024 Not Detected  Not Detected Final         Mendoza Osborne MD        \"This note has been constructed using a voice recognition system.Therefore there may be syntax, spelling, and/or grammatical errors. Please call if you have any questions. \"  "

## 2024-06-08 LAB
ALBUMIN SERPL-MCNC: 4.4 G/DL (ref 3.6–5.1)
ALBUMIN/GLOB SERPL: 1.8 (CALC) (ref 1–2.5)
ALP SERPL-CCNC: 55 U/L (ref 31–125)
ALT SERPL-CCNC: 23 U/L (ref 6–29)
APPEARANCE UR: CLEAR
AST SERPL-CCNC: 21 U/L (ref 10–30)
BACTERIA UR QL AUTO: ABNORMAL /HPF
BASOPHILS # BLD AUTO: 30 CELLS/UL (ref 0–200)
BASOPHILS NFR BLD AUTO: 0.4 %
BILIRUB SERPL-MCNC: 1 MG/DL (ref 0.2–1.2)
BILIRUB UR QL STRIP: NEGATIVE
BUN SERPL-MCNC: 11 MG/DL (ref 7–25)
BUN/CREAT SERPL: NORMAL (CALC) (ref 6–22)
CALCIUM SERPL-MCNC: 9.3 MG/DL (ref 8.6–10.2)
CHLORIDE SERPL-SCNC: 102 MMOL/L (ref 98–110)
CHOLEST SERPL-MCNC: 191 MG/DL
CHOLEST/HDLC SERPL: 2.5 (CALC)
CO2 SERPL-SCNC: 26 MMOL/L (ref 20–32)
COLOR UR: ABNORMAL
CREAT SERPL-MCNC: 0.76 MG/DL (ref 0.5–0.96)
EOSINOPHIL # BLD AUTO: 89 CELLS/UL (ref 15–500)
EOSINOPHIL NFR BLD AUTO: 1.2 %
ERYTHROCYTE [DISTWIDTH] IN BLOOD BY AUTOMATED COUNT: 14.2 % (ref 11–15)
GFR/BSA.PRED SERPLBLD CYS-BASED-ARV: 114 ML/MIN/1.73M2
GLOBULIN SER CALC-MCNC: 2.4 G/DL (CALC) (ref 1.9–3.7)
GLUCOSE SERPL-MCNC: 89 MG/DL (ref 65–99)
GLUCOSE UR QL STRIP: NEGATIVE
HBA1C MFR BLD: 5.9 % OF TOTAL HGB
HCT VFR BLD AUTO: 40.4 % (ref 35–45)
HDLC SERPL-MCNC: 75 MG/DL
HGB BLD-MCNC: 13.1 G/DL (ref 11.7–15.5)
HGB UR QL STRIP: NEGATIVE
HYALINE CASTS #/AREA URNS LPF: ABNORMAL /LPF
KETONES UR QL STRIP: ABNORMAL
LDLC SERPL CALC-MCNC: 97 MG/DL (CALC)
LEUKOCYTE ESTERASE UR QL STRIP: ABNORMAL
LYMPHOCYTES # BLD AUTO: 2227 CELLS/UL (ref 850–3900)
LYMPHOCYTES NFR BLD AUTO: 30.1 %
MCH RBC QN AUTO: 25.6 PG (ref 27–33)
MCHC RBC AUTO-ENTMCNC: 32.4 G/DL (ref 32–36)
MCV RBC AUTO: 78.9 FL (ref 80–100)
MONOCYTES # BLD AUTO: 533 CELLS/UL (ref 200–950)
MONOCYTES NFR BLD AUTO: 7.2 %
NEUTROPHILS # BLD AUTO: 4521 CELLS/UL (ref 1500–7800)
NEUTROPHILS NFR BLD AUTO: 61.1 %
NITRITE UR QL STRIP: POSITIVE
NONHDLC SERPL-MCNC: 116 MG/DL (CALC)
PH UR STRIP: 6 [PH] (ref 5–8)
PLATELET # BLD AUTO: 279 THOUSAND/UL (ref 140–400)
PMV BLD REES-ECKER: 10.7 FL (ref 7.5–12.5)
POTASSIUM SERPL-SCNC: 4.2 MMOL/L (ref 3.5–5.3)
PROT SERPL-MCNC: 6.8 G/DL (ref 6.1–8.1)
PROT UR QL STRIP: ABNORMAL
RBC # BLD AUTO: 5.12 MILLION/UL (ref 3.8–5.1)
RBC #/AREA URNS HPF: ABNORMAL /HPF
SODIUM SERPL-SCNC: 137 MMOL/L (ref 135–146)
SP GR UR STRIP: 1.02 (ref 1–1.03)
SQUAMOUS #/AREA URNS HPF: ABNORMAL /HPF
TRIGL SERPL-MCNC: 94 MG/DL
TSH SERPL-ACNC: 1.05 MIU/L
WBC # BLD AUTO: 7.4 THOUSAND/UL (ref 3.8–10.8)
WBC #/AREA URNS HPF: ABNORMAL /HPF

## 2024-06-10 ENCOUNTER — OFFICE VISIT (OUTPATIENT)
Dept: FAMILY MEDICINE CLINIC | Facility: CLINIC | Age: 19
End: 2024-06-10
Payer: COMMERCIAL

## 2024-06-10 VITALS
BODY MASS INDEX: 24.71 KG/M2 | HEART RATE: 75 BPM | HEIGHT: 67 IN | WEIGHT: 157.4 LBS | DIASTOLIC BLOOD PRESSURE: 68 MMHG | OXYGEN SATURATION: 99 % | SYSTOLIC BLOOD PRESSURE: 110 MMHG

## 2024-06-10 DIAGNOSIS — Z30.44 ENCOUNTER FOR SURVEILLANCE OF VAGINAL RING HORMONAL CONTRACEPTIVE DEVICE: ICD-10-CM

## 2024-06-10 DIAGNOSIS — F90.2 ATTENTION DEFICIT HYPERACTIVITY DISORDER (ADHD), COMBINED TYPE: Primary | ICD-10-CM

## 2024-06-10 DIAGNOSIS — R73.03 PREDIABETES: ICD-10-CM

## 2024-06-10 DIAGNOSIS — J45.20 MILD INTERMITTENT ASTHMA WITHOUT COMPLICATION: ICD-10-CM

## 2024-06-10 PROCEDURE — 99214 OFFICE O/P EST MOD 30 MIN: CPT | Performed by: INTERNAL MEDICINE

## 2024-06-10 RX ORDER — DEXTROAMPHETAMINE SACCHARATE, AMPHETAMINE ASPARTATE MONOHYDRATE, DEXTROAMPHETAMINE SULFATE AND AMPHETAMINE SULFATE 5; 5; 5; 5 MG/1; MG/1; MG/1; MG/1
20 CAPSULE, EXTENDED RELEASE ORAL EVERY MORNING
Qty: 30 CAPSULE | Refills: 0 | Status: SHIPPED | OUTPATIENT
Start: 2024-06-10

## 2024-06-10 RX ORDER — DEXTROAMPHETAMINE SACCHARATE, AMPHETAMINE ASPARTATE, DEXTROAMPHETAMINE SULFATE AND AMPHETAMINE SULFATE 2.5; 2.5; 2.5; 2.5 MG/1; MG/1; MG/1; MG/1
10 TABLET ORAL
Qty: 60 TABLET | Refills: 0 | Status: CANCELLED | OUTPATIENT
Start: 2024-06-10

## 2024-06-10 RX ORDER — ETONOGESTREL AND ETHINYL ESTRADIOL VAGINAL RING .015; .12 MG/D; MG/D
1 RING VAGINAL
Qty: 3 EACH | Refills: 3 | Status: CANCELLED | OUTPATIENT
Start: 2024-06-10

## 2024-06-10 NOTE — ASSESSMENT & PLAN NOTE
Goal Outcome Evaluation:  Plan of Care Reviewed With: patient        Progress: improving  Outcome Evaluation: patient vss. pain well controlled. surgical chest tube removed today. apical chest tube remains to waterseal. tube feeds to start at 1800.   Patient with a history of asthma no recent episodes of acute attacks uses the inhaler as needed albuterol

## 2024-06-10 NOTE — ASSESSMENT & PLAN NOTE
Lab reveals hemoglobin A1c at 5.9 rest of the labs are unremarkable cholesterol at 191 HDL 75 triglycerides 94 LDL at 97.  Recommend very strongly to follow strict diet especially with regards to the sugary drinks carbohydrates test to be multigrain whole-wheat avoid starchy foods.  Strong family history of diabetes

## 2024-06-11 RX ORDER — ETONOGESTREL AND ETHINYL ESTRADIOL VAGINAL RING .015; .12 MG/D; MG/D
1 RING VAGINAL
Qty: 3 EACH | Refills: 0 | Status: SHIPPED | OUTPATIENT
Start: 2024-06-11

## 2024-09-02 DIAGNOSIS — Z30.44 ENCOUNTER FOR SURVEILLANCE OF VAGINAL RING HORMONAL CONTRACEPTIVE DEVICE: ICD-10-CM

## 2024-09-02 RX ORDER — ETONOGESTREL AND ETHINYL ESTRADIOL .015; .12 MG/D; MG/D
INSERT, EXTENDED RELEASE VAGINAL
Qty: 3 EACH | Refills: 1 | Status: SHIPPED | OUTPATIENT
Start: 2024-09-02

## 2024-10-31 ENCOUNTER — TELEPHONE (OUTPATIENT)
Age: 19
End: 2024-10-31

## 2024-10-31 DIAGNOSIS — J45.20 MILD INTERMITTENT ASTHMATIC BRONCHITIS WITHOUT COMPLICATION: Primary | ICD-10-CM

## 2024-10-31 DIAGNOSIS — J45.20 MILD INTERMITTENT ASTHMA WITHOUT COMPLICATION: ICD-10-CM

## 2024-10-31 RX ORDER — PREDNISONE 10 MG/1
TABLET ORAL
Qty: 18 TABLET | Refills: 0 | Status: SHIPPED | OUTPATIENT
Start: 2024-10-31

## 2024-10-31 RX ORDER — AZITHROMYCIN 250 MG/1
TABLET, FILM COATED ORAL
Qty: 6 TABLET | Refills: 0 | Status: SHIPPED | OUTPATIENT
Start: 2024-10-31 | End: 2024-11-05

## 2024-10-31 NOTE — PROGRESS NOTES
Patient is calling for 11 pound weight she is studying complains of cough congestion mild shortness of breath history of asthmatic bronchitis.  No fever.  She has tested negative for COVID.  Will prescribe her Z-Gee and prednisone which she has taken before and make an appointment in the office.  Patient should go to the emergency room if she is not feeling any better

## 2024-10-31 NOTE — TELEPHONE ENCOUNTER
Lyssa called in stated that she is away at [facility] right now and think she has bronchitis. She has been sick for three days now. She has had this before and remembers the feeling. She would like to speak with Dr Osborne to see if he could send something for her while she is out there. Please Advise. Did let her know that she might have to go to Urgent Care.

## 2025-06-09 ENCOUNTER — NURSE TRIAGE (OUTPATIENT)
Age: 20
End: 2025-06-09

## 2025-06-09 NOTE — TELEPHONE ENCOUNTER
Regarding: Possible yeast infection  ----- Message from Ev WILKINS sent at 6/9/2025 10:15 AM EDT -----  Patient called in thinks she has a yeast infection. More like BV no discharge, more odor and itchy. Patient is on her period.

## 2025-06-09 NOTE — TELEPHONE ENCOUNTER
"Answer Assessment - Initial Assessment Questions  1. DISCHARGE: \"Describe the discharge.\" (e.g., white, yellow, green, gray, foamy, cottage cheese-like)      Creamy yellow discharge  2. ODOR: \"Is there a bad odor?\"      Fishy type odor  3. ONSET: \"When did the discharge begin?\"      On and off x several months-has been away at college  4. RASH: \"Is there a rash in the genital area?\" If Yes, ask: \"Describe it.\" (e.g., redness, blisters, sores, bumps)      Rash on inner area of buttocks  5. ABDOMEN PAIN: \"Are you having any abdomen pain?\" If Yes, ask: \"What does it feel like? \" (e.g., crampy, dull, intermittent, constant)       denies  6. ABDOMEN PAIN SEVERITY: If present, ask: \"How bad is it?\" (e.g., Scale 1-10; mild, moderate, or severe)      N/a  7. CAUSE: \"What do you think is causing the discharge?\" \"Have you had the same problem before?\" \"What happened then?\"      BV  8. OTHER SYMPTOMS: \"Do you have any other symptoms?\" (e.g., fever, itching, vaginal bleeding, pain with urination, injury to genital area, vaginal foreign body)      yes  9. PREGNANCY: \"Is there any chance you are pregnant?\" \"When was your last menstrual period?\"      LMP 6/8/2025    Protocols used: Vaginal Discharge-Adult-[place]    "

## 2025-06-20 NOTE — TELEPHONE ENCOUNTER
Patient calling back in to schedule since yearly needed to be rescheduled.  Appointment scheduled for 6/26.  Advised to try coconut oil in the mean time.  Reviewed good genital hygiene such as mild unscented soaps to wash, cotton underwear, and the importance of staying clean and dry.

## 2025-06-26 ENCOUNTER — OFFICE VISIT (OUTPATIENT)
Dept: OBGYN CLINIC | Facility: CLINIC | Age: 20
End: 2025-06-26
Payer: COMMERCIAL

## 2025-06-26 VITALS
WEIGHT: 155 LBS | BODY MASS INDEX: 24.33 KG/M2 | DIASTOLIC BLOOD PRESSURE: 76 MMHG | SYSTOLIC BLOOD PRESSURE: 110 MMHG | HEIGHT: 67 IN

## 2025-06-26 DIAGNOSIS — Z11.3 SCREEN FOR STD (SEXUALLY TRANSMITTED DISEASE): ICD-10-CM

## 2025-06-26 DIAGNOSIS — N76.0 ACUTE VAGINITIS: Primary | ICD-10-CM

## 2025-06-26 PROBLEM — B34.9 VIRAL SYNDROME: Status: RESOLVED | Noted: 2023-08-23 | Resolved: 2025-06-26

## 2025-06-26 PROCEDURE — 87510 GARDNER VAG DNA DIR PROBE: CPT | Performed by: PHYSICIAN ASSISTANT

## 2025-06-26 PROCEDURE — 99213 OFFICE O/P EST LOW 20 MIN: CPT | Performed by: PHYSICIAN ASSISTANT

## 2025-06-26 PROCEDURE — 87491 CHLMYD TRACH DNA AMP PROBE: CPT | Performed by: PHYSICIAN ASSISTANT

## 2025-06-26 PROCEDURE — 87660 TRICHOMONAS VAGIN DIR PROBE: CPT | Performed by: PHYSICIAN ASSISTANT

## 2025-06-26 PROCEDURE — 87591 N.GONORRHOEAE DNA AMP PROB: CPT | Performed by: PHYSICIAN ASSISTANT

## 2025-06-26 PROCEDURE — 87480 CANDIDA DNA DIR PROBE: CPT | Performed by: PHYSICIAN ASSISTANT

## 2025-06-26 RX ORDER — LAMOTRIGINE 150 MG/1
1 TABLET ORAL DAILY
COMMUNITY
Start: 2025-05-28

## 2025-06-26 RX ORDER — ATOMOXETINE 60 MG/1
CAPSULE ORAL
COMMUNITY
Start: 2025-05-01

## 2025-06-26 NOTE — PROGRESS NOTES
"Name: Lyssa Teran      : 2005      MRN: [id number]  Encounter Provider: Faith Saxena PA-C  Encounter Date: 2025   Encounter department: [facility]  :  Assessment & Plan  Acute vaginitis    Orders:    VAGINOSIS DNA PROBE    Screen for STD (sexually transmitted disease)    Orders:    Chlamydia/GC amplified DNA by PCR    GC/chlamydia screening and vaginal cultures done.  We will call with results.  Can start OTC Monistat 7 day vaginal cream.  Stressed consistent condom use for STD prevention.  F/u for yearly gyn exam as planned.  Call if symptoms worsen or change.    History of Present Illness   Patient is here with complaint of possible vulvovaginal infection.  States symptoms have been present for about a month.  Complains of vaginal discharge, odor, and vulvar itching.  Has had BV in the past.  Has been using almond oil for symptom relief.  Denies urinary symptoms, pelvic pain, abdominal pain, n/v, and fever/chills.  Patient is sexually active.      Lyssa Teran is a 20 y.o. female who presents with possible vulvovaginal infection.  History obtained from: patient    Review of Systems   Constitutional:  Negative for chills and fever.   Gastrointestinal:  Negative for abdominal distention, abdominal pain, nausea and vomiting.   Genitourinary:  Positive for vaginal discharge (With odor). Negative for difficulty urinating, dysuria, frequency, genital sores, hematuria, menstrual problem, pelvic pain, urgency, vaginal bleeding and vaginal pain.        Vulvar itching          Objective   /76 (BP Location: Left arm, Patient Position: Sitting, Cuff Size: Adult)   Ht 5' 7\" (1.702 m)   Wt 70.3 kg (155 lb)   LMP 2025 (Exact Date)   BMI 24.28 kg/m²      Physical Exam  Vitals reviewed. Exam conducted with a chaperone present.   Constitutional:       Appearance: Normal appearance. She is well-developed and normal weight.   Genitourinary:     General: Normal " vulva.      Pubic Area: No rash.       Labia:         Right: No rash, tenderness, lesion or injury.         Left: No rash, tenderness, lesion or injury.       Vagina: Normal. No vaginal discharge, erythema, tenderness or bleeding.      Cervix: Normal.      Uterus: Normal.       Adnexa: Right adnexa normal and left adnexa normal.        Right: No mass, tenderness or fullness.          Left: No mass, tenderness or fullness.     Lymphadenopathy:      Lower Body: No right inguinal adenopathy. No left inguinal adenopathy.     Skin:     General: Skin is warm and dry.     Neurological:      Mental Status: She is alert and oriented to person, place, and time.     Psychiatric:         Mood and Affect: Mood normal.         Behavior: Behavior normal. Behavior is cooperative.         Thought Content: Thought content normal.         Judgment: Judgment normal.

## 2025-06-26 NOTE — PATIENT INSTRUCTIONS
Start over the counter Monistat 7 day vaginal cream.    Practice consistent condom use for STD prevention.    Call if symptoms worsen or change.

## 2025-06-27 ENCOUNTER — RESULTS FOLLOW-UP (OUTPATIENT)
Dept: OBGYN CLINIC | Facility: CLINIC | Age: 20
End: 2025-06-27

## 2025-06-27 DIAGNOSIS — N76.0 BV (BACTERIAL VAGINOSIS): Primary | ICD-10-CM

## 2025-06-27 DIAGNOSIS — B96.89 BV (BACTERIAL VAGINOSIS): Primary | ICD-10-CM

## 2025-06-27 RX ORDER — METRONIDAZOLE 500 MG/1
500 TABLET ORAL EVERY 12 HOURS SCHEDULED
Qty: 14 TABLET | Refills: 0 | Status: SHIPPED | OUTPATIENT
Start: 2025-06-27 | End: 2025-07-04

## 2025-07-07 ENCOUNTER — TELEMEDICINE (OUTPATIENT)
Age: 20
End: 2025-07-07
Payer: COMMERCIAL

## 2025-07-07 VITALS — HEIGHT: 67 IN | BODY MASS INDEX: 24.28 KG/M2

## 2025-07-07 DIAGNOSIS — F90.2 ATTENTION DEFICIT HYPERACTIVITY DISORDER (ADHD), COMBINED TYPE: Primary | ICD-10-CM

## 2025-07-07 DIAGNOSIS — Z13.0 SCREENING FOR DEFICIENCY ANEMIA: ICD-10-CM

## 2025-07-07 DIAGNOSIS — J45.20 MILD INTERMITTENT ASTHMA WITHOUT COMPLICATION: ICD-10-CM

## 2025-07-07 DIAGNOSIS — R73.03 PREDIABETES: ICD-10-CM

## 2025-07-07 DIAGNOSIS — Z13.29 SCREENING FOR THYROID DISORDER: ICD-10-CM

## 2025-07-07 DIAGNOSIS — E78.5 DYSLIPIDEMIA: ICD-10-CM

## 2025-07-07 PROCEDURE — 99213 OFFICE O/P EST LOW 20 MIN: CPT | Performed by: INTERNAL MEDICINE

## 2025-07-07 RX ORDER — ATOMOXETINE 60 MG/1
60 CAPSULE ORAL DAILY
Qty: 30 CAPSULE | Refills: 0 | Status: SHIPPED | OUTPATIENT
Start: 2025-07-07

## 2025-07-07 NOTE — PROGRESS NOTES
Virtual Regular Visit  Name: Lyssa Teran      : 2005      MRN: [id number]  Encounter Provider: Mendoza Osborne MD  Encounter Date: 2025   Encounter department: [facility]  :  Assessment & Plan  Attention deficit hyperactivity disorder (ADHD), combined type  Patient is currently taking atomoxetine 60 mg daily being prescribed by the psychiatrist in [place] patient has moved back now to this area will give her temporary supply of the same recommend patient strongly to see the psychiatrist also locally.  Patient also has been started on Lamictal I do not have all the details    Orders:  •  atoMOXetine (STRATTERA) 60 mg capsule; Take 1 capsule (60 mg total) by mouth daily    Mild intermittent asthma without complication  Currently on albuterol as needed       Prediabetes  Last hemoglobin A1c was 5.9 we will follow-up with repeat 1 recommend strict diet  Orders:  •  Comprehensive metabolic panel; Future  •  Hemoglobin A1C; Future  •  UA w Reflex to Microscopic w Reflex to Culture; Future    Screening for thyroid disorder    Orders:  •  TSH, 3rd generation with Free T4 reflex; Future    Screening for deficiency anemia    Orders:  •  CBC and differential; Future    Dyslipidemia    Orders:  •  Lipid panel; Future        History of Present Illness  see the history of present illness    This is a video call privacy has been explained to the patient.  Patient with a history of attention deficit disorder being followed with psychiatrist at [place] where she has been studying.  Now she has moved back to this area currently she is taking Strattera and Lamictal.  I do not have all the details from the psychiatrist at this time.  Patient otherwise states she is feeling fine on these 2 medications.    Medications reviewed patient with a history of mild asthma only takes as needed Proventil inhaler.  He is also due for lab work which I have ordered in the past not done yet.      Review of Systems  "  Constitutional:  Negative for chills and fever.   HENT:  Negative for ear pain and sore throat.    Eyes:  Negative for pain and visual disturbance.   Respiratory:  Negative for cough and shortness of breath.    Cardiovascular:  Negative for chest pain and palpitations.   Gastrointestinal:  Negative for abdominal pain and vomiting.   Genitourinary:  Negative for dysuria and hematuria.   Musculoskeletal:  Negative for arthralgias and back pain.   Skin:  Negative for color change and rash.   Neurological:  Negative for seizures and syncope.   All other systems reviewed and are negative.      Objective   Ht 5' 7\" (1.702 m)   LMP 06/08/2025 (Exact Date)   BMI 24.28 kg/m²     Physical Exam  Complete physical examination could not be done in this virtual visit patient has been ambulatory denies any symptoms of cough congestion cold body aches nausea vomiting diarrhea.  She is alert answering all questions.  Administrative Statements   Encounter provider Mendoza Osborne MD    The Patient is located at Home and in the following state in which I hold an active license [place].    The patient was identified by name and date of birth. Lyssa Teran was informed that this is a telemedicine visit and that the visit is being conducted through the Epic Embedded platform. She agrees to proceed..  My office door was closed. No one else was in the room.  She acknowledged consent and understanding of privacy and security of the video platform. The patient has agreed to participate and understands they can discontinue the visit at any time.    I have spent a total time of 15 minutes in caring for this patient on the day of the visit/encounter including Diagnostic results, Prognosis, Risks and benefits of tx options, Instructions for management, Patient and family education, Importance of tx compliance, Risk factor reductions, Impressions, Counseling / Coordination of care, Documenting in the medical record, Reviewing/placing orders in the " medical record (including tests, medications, and/or procedures), and Obtaining or reviewing history  , not including the time spent for establishing the audio/video connection.

## 2025-07-07 NOTE — ASSESSMENT & PLAN NOTE
Patient is currently taking atomoxetine 60 mg daily being prescribed by the psychiatrist in [place] patient has moved back now to this area will give her temporary supply of the same recommend patient strongly to see the psychiatrist also locally.  Patient also has been started on Lamictal I do not have all the details    Orders:  •  atoMOXetine (STRATTERA) 60 mg capsule; Take 1 capsule (60 mg total) by mouth daily

## 2025-07-07 NOTE — ASSESSMENT & PLAN NOTE
Last hemoglobin A1c was 5.9 we will follow-up with repeat 1 recommend strict diet  Orders:  •  Comprehensive metabolic panel; Future  •  Hemoglobin A1C; Future  •  UA w Reflex to Microscopic w Reflex to Culture; Future

## 2025-07-21 ENCOUNTER — OFFICE VISIT (OUTPATIENT)
Age: 20
End: 2025-07-21
Payer: COMMERCIAL

## 2025-07-21 VITALS
SYSTOLIC BLOOD PRESSURE: 110 MMHG | HEART RATE: 90 BPM | DIASTOLIC BLOOD PRESSURE: 72 MMHG | HEIGHT: 67 IN | WEIGHT: 154.8 LBS | OXYGEN SATURATION: 100 % | BODY MASS INDEX: 24.3 KG/M2

## 2025-07-21 DIAGNOSIS — Z00.00 ANNUAL PHYSICAL EXAM: ICD-10-CM

## 2025-07-21 DIAGNOSIS — Z11.4 SCREENING FOR HIV (HUMAN IMMUNODEFICIENCY VIRUS): ICD-10-CM

## 2025-07-21 DIAGNOSIS — F90.2 ATTENTION DEFICIT HYPERACTIVITY DISORDER (ADHD), COMBINED TYPE: Primary | ICD-10-CM

## 2025-07-21 DIAGNOSIS — Z11.59 NEED FOR HEPATITIS C SCREENING TEST: ICD-10-CM

## 2025-07-21 DIAGNOSIS — J45.20 MILD INTERMITTENT ASTHMATIC BRONCHITIS WITHOUT COMPLICATION: ICD-10-CM

## 2025-07-21 DIAGNOSIS — R73.03 PREDIABETES: ICD-10-CM

## 2025-07-21 DIAGNOSIS — J45.20 MILD INTERMITTENT ASTHMA WITHOUT COMPLICATION: ICD-10-CM

## 2025-07-21 PROCEDURE — 99395 PREV VISIT EST AGE 18-39: CPT | Performed by: INTERNAL MEDICINE

## 2025-07-21 PROCEDURE — 99214 OFFICE O/P EST MOD 30 MIN: CPT | Performed by: INTERNAL MEDICINE

## 2025-07-21 NOTE — ASSESSMENT & PLAN NOTE
Patient is on Strattera and was being followed by the psychiatrist in [place] will arrange for patient to be seen locally also and we will continue the medication.  She has been also started on Lamictal with psychiatrist.

## 2025-07-21 NOTE — PROGRESS NOTES
Adult Annual Physical  Name: Lyssa Teran      : 2005      MRN: [id number]  Encounter Provider: Mendoza Osborne MD  Encounter Date: 2025   Encounter department: [facility]    :  Assessment & Plan  Attention deficit hyperactivity disorder (ADHD), combined type  Patient is on Strattera and was being followed by the psychiatrist in [place] will arrange for patient to be seen locally also and we will continue the medication.  She has been also started on Lamictal with psychiatrist.       Mild intermittent asthma without complication  On albuterol as needed       Prediabetes  Last A1c was 5.9 we will follow-up with repeat       Need for hepatitis C screening test    Orders:  •  Hepatitis C antibody; Future    Screening for HIV (human immunodeficiency virus)    Orders:  •  HIV 1/2 AG/AB w Reflex [facility] for 2 yr old and above; Future    Mild intermittent asthmatic bronchitis without complication  Patient is on as needed albuterol inhaler she uses it occasionally when she has cold symptoms cough symptoms otherwise patient is doing fine.       Annual physical exam             Preventive Screenings:    - Chlamydia Screening: screening up-to-date   - Hepatitis C screening: patient agrees to screening   - HIV screening: patient agrees to screening   - Cervical cancer screening: screening not indicated   - Colon cancer screening: screening not indicated   - Lung cancer screening: screening not indicated     Immunizations:  - Immunizations due: Prevnar 20, Tdap and HPV (Gardasil 9)    Counseling/Anticipatory Guidance:  - Alcohol: discussed moderation in alcohol intake and recommendations for healthy alcohol use.   - Drug use: discussed harms of illicit drug use and how it can negatively impact mental/physical health.   - Tobacco use: discussed harms of tobacco use and management options for quitting.   - Dental health: discussed importance of regular tooth brushing, flossing, and dental visits.    - Sexual health: discussed sexually transmitted diseases, partner selection, use of condoms, avoidance of unintended pregnancy, and contraceptive alternatives.   - Diet: discussed recommendations for a healthy/well-balanced diet.   - Exercise: the importance of regular exercise/physical activity was discussed. Recommend exercise 3-5 times per week for at least 30 minutes.   - Injury prevention: discussed safety/seat belts, safety helmets, smoke detectors, carbon monoxide detectors, and smoking near bedding or upholstery.          History of Present Illness patient is coming here for a follow-up evaluation with a history of attention deficit disorder currently taking Strattera she is also being followed by the psychiatrist when she was in [place] he is also on Lamictal.    Medications reviewed patient is due for some lab work.    Adult Annual Physical:  Patient presents for annual physical.     Diet and Physical Activity:  - Diet/Nutrition: well balanced diet.  - Exercise: strength training exercises and more than 2 hours on average. at work has a physical job.    Depression Screening:  - PHQ-2 Score: 0    General Health:  - Sleep: sleeps well and 7-8 hours of sleep on average.  - Hearing: normal hearing bilateral ears.  - Vision: wears glasses, wears contacts, no vision problems and most recent eye exam < 1 year ago.  - Dental: regular dental visits and brushes teeth twice daily.    /GYN Health:  - Follows with GYN: yes.   - History of STDs: no  - Contraception:. none      Advanced Care Planning:  - Has an advanced directive?: no    - Has a durable medical POA?: no    - ACP document given to patient?: no      Review of Systems   Constitutional:  Negative for chills and fever.   HENT:  Negative for ear pain and sore throat.    Eyes:  Negative for pain and visual disturbance.   Respiratory:  Negative for cough and shortness of breath.    Cardiovascular:  Negative for chest pain and palpitations.  "  Gastrointestinal:  Negative for abdominal pain and vomiting.   Genitourinary:  Negative for dysuria and hematuria.   Musculoskeletal:  Negative for arthralgias and back pain.   Skin:  Negative for color change and rash.   Neurological:  Negative for seizures and syncope.   All other systems reviewed and are negative.        Objective   /72   Pulse 90   Ht 5' 7\" (1.702 m)   Wt 70.2 kg (154 lb 12.8 oz)   LMP 06/08/2025 (Exact Date)   SpO2 100%   BMI 24.25 kg/m²     Physical Exam  Vitals and nursing note reviewed.   Constitutional:       General: She is not in acute distress.     Appearance: She is well-developed.   HENT:      Head: Normocephalic and atraumatic.     Eyes:      Conjunctiva/sclera: Conjunctivae normal.       Cardiovascular:      Rate and Rhythm: Normal rate and regular rhythm.      Heart sounds: No murmur heard.  Pulmonary:      Effort: Pulmonary effort is normal. No respiratory distress.      Breath sounds: Normal breath sounds.   Abdominal:      Palpations: Abdomen is soft.      Tenderness: There is no abdominal tenderness.     Musculoskeletal:         General: No swelling.      Cervical back: Neck supple.     Skin:     General: Skin is warm and dry.      Capillary Refill: Capillary refill takes less than 2 seconds.     Neurological:      Mental Status: She is alert and oriented to person, place, and time.     Psychiatric:         Mood and Affect: Mood normal.         Behavior: Behavior normal.         "

## 2025-07-21 NOTE — ASSESSMENT & PLAN NOTE
Patient is on as needed albuterol inhaler she uses it occasionally when she has cold symptoms cough symptoms otherwise patient is doing fine.

## 2025-07-21 NOTE — PATIENT INSTRUCTIONS
"Patient Education     Routine physical for adults   The Basics   Written by the doctors and editors at [facility]   What is a physical? -- A physical is a routine visit, or \"check-up,\" with your doctor. You might also hear it called a \"wellness visit\" or \"preventive visit.\"  During each visit, the doctor will:   Ask about your physical and mental health   Ask about your habits, behaviors, and lifestyle   Do an exam   Give you vaccines if needed   Talk to you about any medicines you take   Give advice about your health   Answer your questions  Getting regular check-ups is an important part of taking care of your health. It can help your doctor find and treat any problems you have. But it's also important for preventing health problems.  A routine physical is different from a \"sick visit.\" A sick visit is when you see a doctor because of a health concern or problem. Since physicals are scheduled ahead of time, you can think about what you want to ask the doctor.  How often should I get a physical? -- It depends on your age and health. In general, for people age 21 years and older:   If you are younger than 50 years, you might be able to get a physical every 3 years.   If you are 50 years or older, your doctor might recommend a physical every year.  If you have an ongoing health condition, like diabetes or high blood pressure, your doctor will probably want to see you more often.  What happens during a physical? -- In general, each visit will include:   Physical exam - The doctor or nurse will check your height, weight, heart rate, and blood pressure. They will also look at your eyes and ears. They will ask about how you are feeling and whether you have any symptoms that bother you.   Medicines - It's a good idea to bring a list of all the medicines you take to each doctor visit. Your doctor will talk to you about your medicines and answer any questions. Tell them if you are having any side effects that bother you. You " "should also tell them if you are having trouble paying for any of your medicines.   Habits and behaviors - This includes:   Your diet   Your exercise habits   Whether you smoke, drink alcohol, or use drugs   Whether you are sexually active   Whether you feel safe at home  Your doctor will talk to you about things you can do to improve your health and lower your risk of health problems. They will also offer help and support. For example, if you want to quit smoking, they can give you advice and might prescribe medicines. If you want to improve your diet or get more physical activity, they can help you with this, too.   Lab tests, if needed - The tests you get will depend on your age and situation. For example, your doctor might want to check your:   Cholesterol   Blood sugar   Iron level   Vaccines - The recommended vaccines will depend on your age, health, and what vaccines you already had. Vaccines are very important because they can prevent certain serious or deadly infections.   Discussion of screening - \"Screening\" means checking for diseases or other health problems before they cause symptoms. Your doctor can recommend screening based on your age, risk, and preferences. This might include tests to check for:   Cancer, such as breast, prostate, cervical, ovarian, colorectal, prostate, lung, or skin cancer   Sexually transmitted infections, such as chlamydia and gonorrhea   Mental health conditions like depression and anxiety  Your doctor will talk to you about the different types of screening tests. They can help you decide which screenings to have. They can also explain what the results might mean.   Answering questions - The physical is a good time to ask the doctor or nurse questions about your health. If needed, they can refer you to other doctors or specialists, too.  Adults older than 65 years often need other care, too. As you get older, your doctor will talk to you about:   How to prevent falling at " home   Hearing or vision tests   Memory testing   How to take your medicines safely   Making sure that you have the help and support you need at home  All topics are updated as new evidence becomes available and our peer review process is complete.  This topic retrieved from [employer] on: May 02, 2024.  Topic 667137 Version 1.0  Release: 32.4.3 - C32.122  © 2024 Up[employer], [employer] and/or its affiliates. All rights reserved.  Consumer Information Use and Disclaimer   Disclaimer: This generalized information is a limited summary of diagnosis, treatment, and/or medication information. It is not meant to be comprehensive and should be used as a tool to help the user understand and/or assess potential diagnostic and treatment options. It does NOT include all information about conditions, treatments, medications, side effects, or risks that may apply to a specific patient. It is not intended to be medical advice or a substitute for the medical advice, diagnosis, or treatment of a health care provider based on the health care provider's examination and assessment of a patient's specific and unique circumstances. Patients must speak with a health care provider for complete information about their health, medical questions, and treatment options, including any risks or benefits regarding use of medications. This information does not endorse any treatments or medications as safe, effective, or approved for treating a specific patient. Up[employer], Inc[employer] and its affiliates disclaim any warranty or liability relating to this information or the use thereof.The use of this information is governed by the Terms of Use, available at https://www.woltersnothingGrinderuwer.com/en/know/clinical-effectiveness-terms. 2024© [employer], Inc. and its affiliates and/or licensors. All rights reserved.  Copyright   © 2024 [employer], [employer] and/or its affiliates. All rights reserved.

## 2025-08-06 ENCOUNTER — ANNUAL EXAM (OUTPATIENT)
Dept: OBGYN CLINIC | Facility: CLINIC | Age: 20
End: 2025-08-06
Payer: COMMERCIAL

## 2025-08-06 VITALS
BODY MASS INDEX: 24.01 KG/M2 | HEIGHT: 67 IN | WEIGHT: 153 LBS | SYSTOLIC BLOOD PRESSURE: 128 MMHG | DIASTOLIC BLOOD PRESSURE: 80 MMHG

## 2025-08-06 DIAGNOSIS — Z80.9 FAMILY HISTORY OF CANCER: ICD-10-CM

## 2025-08-06 DIAGNOSIS — N94.6 DYSMENORRHEA: ICD-10-CM

## 2025-08-06 DIAGNOSIS — Z11.3 SCREENING FOR STDS (SEXUALLY TRANSMITTED DISEASES): ICD-10-CM

## 2025-08-06 DIAGNOSIS — Z01.419 ENCOUNTER FOR GYNECOLOGICAL EXAMINATION WITHOUT ABNORMAL FINDING: Primary | ICD-10-CM

## 2025-08-06 PROCEDURE — 99395 PREV VISIT EST AGE 18-39: CPT | Performed by: OBSTETRICS & GYNECOLOGY

## 2025-08-06 RX ORDER — NORETHINDRONE ACETATE AND ETHINYL ESTRADIOL AND FERROUS FUMARATE 1MG-20(24)
1 KIT ORAL DAILY
Qty: 28 TABLET | Refills: 3 | Status: SHIPPED | OUTPATIENT
Start: 2025-08-06

## 2025-08-07 LAB
C TRACH DNA SPEC QL NAA+PROBE: NEGATIVE
N GONORRHOEA DNA SPEC QL NAA+PROBE: NEGATIVE

## 2025-08-11 ENCOUNTER — TELEMEDICINE (OUTPATIENT)
Age: 20
End: 2025-08-11
Payer: COMMERCIAL

## 2025-08-11 LAB
LAB AP GYN PRIMARY INTERPRETATION: NORMAL
Lab: NORMAL